# Patient Record
Sex: FEMALE | Race: WHITE | NOT HISPANIC OR LATINO | Employment: UNEMPLOYED | URBAN - NONMETROPOLITAN AREA
[De-identification: names, ages, dates, MRNs, and addresses within clinical notes are randomized per-mention and may not be internally consistent; named-entity substitution may affect disease eponyms.]

---

## 2020-09-23 ENCOUNTER — APPOINTMENT (OUTPATIENT)
Dept: GENERAL RADIOLOGY | Facility: HOSPITAL | Age: 45
End: 2020-09-23

## 2020-09-23 ENCOUNTER — HOSPITAL ENCOUNTER (OUTPATIENT)
Facility: HOSPITAL | Age: 45
Setting detail: OBSERVATION
Discharge: HOME | End: 2020-09-24
Attending: STUDENT IN AN ORGANIZED HEALTH CARE EDUCATION/TRAINING PROGRAM | Admitting: INTERNAL MEDICINE

## 2020-09-23 ENCOUNTER — APPOINTMENT (OUTPATIENT)
Dept: CT IMAGING | Facility: HOSPITAL | Age: 45
End: 2020-09-23

## 2020-09-23 DIAGNOSIS — R53.1 WEAKNESS: ICD-10-CM

## 2020-09-23 DIAGNOSIS — Z78.9 IMPAIRED MOBILITY AND ACTIVITIES OF DAILY LIVING: ICD-10-CM

## 2020-09-23 DIAGNOSIS — Z74.09 IMPAIRED MOBILITY AND ACTIVITIES OF DAILY LIVING: ICD-10-CM

## 2020-09-23 DIAGNOSIS — R51.9 ACUTE NONINTRACTABLE HEADACHE, UNSPECIFIED HEADACHE TYPE: Primary | ICD-10-CM

## 2020-09-23 LAB
ALBUMIN SERPL-MCNC: 4.7 G/DL (ref 3.5–5.2)
ALBUMIN/GLOB SERPL: 2.1 G/DL
ALP SERPL-CCNC: 84 U/L (ref 39–117)
ALT SERPL W P-5'-P-CCNC: 17 U/L (ref 1–33)
ANION GAP SERPL CALCULATED.3IONS-SCNC: 12 MMOL/L (ref 5–15)
AST SERPL-CCNC: 13 U/L (ref 1–32)
B-HCG UR QL: NEGATIVE
BACTERIA UR QL AUTO: ABNORMAL /HPF
BASOPHILS # BLD AUTO: 0.07 10*3/MM3 (ref 0–0.2)
BASOPHILS NFR BLD AUTO: 0.6 % (ref 0–1.5)
BILIRUB SERPL-MCNC: <0.2 MG/DL (ref 0–1.2)
BILIRUB UR QL STRIP: NEGATIVE
BUN SERPL-MCNC: 21 MG/DL (ref 6–20)
BUN/CREAT SERPL: 23.3 (ref 7–25)
CALCIUM SPEC-SCNC: 9.6 MG/DL (ref 8.6–10.5)
CHLORIDE SERPL-SCNC: 104 MMOL/L (ref 98–107)
CLARITY UR: ABNORMAL
CO2 SERPL-SCNC: 21 MMOL/L (ref 22–29)
COLOR UR: YELLOW
CREAT SERPL-MCNC: 0.9 MG/DL (ref 0.57–1)
CRP SERPL-MCNC: 0.76 MG/DL (ref 0–0.5)
DEPRECATED RDW RBC AUTO: 41.8 FL (ref 37–54)
EOSINOPHIL # BLD AUTO: 0.05 10*3/MM3 (ref 0–0.4)
EOSINOPHIL NFR BLD AUTO: 0.4 % (ref 0.3–6.2)
ERYTHROCYTE [DISTWIDTH] IN BLOOD BY AUTOMATED COUNT: 13.5 % (ref 12.3–15.4)
ERYTHROCYTE [SEDIMENTATION RATE] IN BLOOD: 9 MM/HR (ref 0–20)
GFR SERPL CREATININE-BSD FRML MDRD: 69 ML/MIN/1.73
GLOBULIN UR ELPH-MCNC: 2.2 GM/DL
GLUCOSE SERPL-MCNC: 102 MG/DL (ref 65–99)
GLUCOSE UR STRIP-MCNC: NEGATIVE MG/DL
HCT VFR BLD AUTO: 37.7 % (ref 34–46.6)
HGB BLD-MCNC: 12.7 G/DL (ref 12–15.9)
HGB UR QL STRIP.AUTO: NEGATIVE
HYALINE CASTS UR QL AUTO: ABNORMAL /LPF
IMM GRANULOCYTES # BLD AUTO: 0.15 10*3/MM3 (ref 0–0.05)
IMM GRANULOCYTES NFR BLD AUTO: 1.2 % (ref 0–0.5)
KETONES UR QL STRIP: NEGATIVE
LEUKOCYTE ESTERASE UR QL STRIP.AUTO: ABNORMAL
LYMPHOCYTES # BLD AUTO: 2.2 10*3/MM3 (ref 0.7–3.1)
LYMPHOCYTES NFR BLD AUTO: 18.2 % (ref 19.6–45.3)
MCH RBC QN AUTO: 28.7 PG (ref 26.6–33)
MCHC RBC AUTO-ENTMCNC: 33.7 G/DL (ref 31.5–35.7)
MCV RBC AUTO: 85.1 FL (ref 79–97)
MONOCYTES # BLD AUTO: 0.52 10*3/MM3 (ref 0.1–0.9)
MONOCYTES NFR BLD AUTO: 4.3 % (ref 5–12)
NEUTROPHILS NFR BLD AUTO: 75.3 % (ref 42.7–76)
NEUTROPHILS NFR BLD AUTO: 9.13 10*3/MM3 (ref 1.7–7)
NITRITE UR QL STRIP: NEGATIVE
NRBC BLD AUTO-RTO: 0 /100 WBC (ref 0–0.2)
PH UR STRIP.AUTO: 5.5 [PH] (ref 5–9)
PLATELET # BLD AUTO: 183 10*3/MM3 (ref 140–450)
PMV BLD AUTO: 11.3 FL (ref 6–12)
POTASSIUM SERPL-SCNC: 3.6 MMOL/L (ref 3.5–5.2)
PROT SERPL-MCNC: 6.9 G/DL (ref 6–8.5)
PROT UR QL STRIP: NEGATIVE
RBC # BLD AUTO: 4.43 10*6/MM3 (ref 3.77–5.28)
RBC # UR: ABNORMAL /HPF
REF LAB TEST METHOD: ABNORMAL
SARS-COV-2 N GENE RESP QL NAA+PROBE: NOT DETECTED
SODIUM SERPL-SCNC: 137 MMOL/L (ref 136–145)
SP GR UR STRIP: 1.02 (ref 1–1.03)
SQUAMOUS #/AREA URNS HPF: ABNORMAL /HPF
UROBILINOGEN UR QL STRIP: ABNORMAL
WBC # BLD AUTO: 12.12 10*3/MM3 (ref 3.4–10.8)
WBC UR QL AUTO: ABNORMAL /HPF

## 2020-09-23 PROCEDURE — 70496 CT ANGIOGRAPHY HEAD: CPT

## 2020-09-23 PROCEDURE — 85025 COMPLETE CBC W/AUTO DIFF WBC: CPT

## 2020-09-23 PROCEDURE — 70450 CT HEAD/BRAIN W/O DYE: CPT

## 2020-09-23 PROCEDURE — 93005 ELECTROCARDIOGRAM TRACING: CPT | Performed by: STUDENT IN AN ORGANIZED HEALTH CARE EDUCATION/TRAINING PROGRAM

## 2020-09-23 PROCEDURE — 86140 C-REACTIVE PROTEIN: CPT | Performed by: STUDENT IN AN ORGANIZED HEALTH CARE EDUCATION/TRAINING PROGRAM

## 2020-09-23 PROCEDURE — 70498 CT ANGIOGRAPHY NECK: CPT

## 2020-09-23 PROCEDURE — 71045 X-RAY EXAM CHEST 1 VIEW: CPT

## 2020-09-23 PROCEDURE — G0378 HOSPITAL OBSERVATION PER HR: HCPCS

## 2020-09-23 PROCEDURE — C9803 HOPD COVID-19 SPEC COLLECT: HCPCS

## 2020-09-23 PROCEDURE — 85651 RBC SED RATE NONAUTOMATED: CPT | Performed by: STUDENT IN AN ORGANIZED HEALTH CARE EDUCATION/TRAINING PROGRAM

## 2020-09-23 PROCEDURE — 96374 THER/PROPH/DIAG INJ IV PUSH: CPT

## 2020-09-23 PROCEDURE — 36415 COLL VENOUS BLD VENIPUNCTURE: CPT

## 2020-09-23 PROCEDURE — 81025 URINE PREGNANCY TEST: CPT | Performed by: STUDENT IN AN ORGANIZED HEALTH CARE EDUCATION/TRAINING PROGRAM

## 2020-09-23 PROCEDURE — 81001 URINALYSIS AUTO W/SCOPE: CPT

## 2020-09-23 PROCEDURE — 0 IOPAMIDOL PER 1 ML: Performed by: STUDENT IN AN ORGANIZED HEALTH CARE EDUCATION/TRAINING PROGRAM

## 2020-09-23 PROCEDURE — 99204 OFFICE O/P NEW MOD 45 MIN: CPT | Performed by: STUDENT IN AN ORGANIZED HEALTH CARE EDUCATION/TRAINING PROGRAM

## 2020-09-23 PROCEDURE — 80053 COMPREHEN METABOLIC PANEL: CPT

## 2020-09-23 PROCEDURE — 99284 EMERGENCY DEPT VISIT MOD MDM: CPT

## 2020-09-23 PROCEDURE — 87635 SARS-COV-2 COVID-19 AMP PRB: CPT | Performed by: INTERNAL MEDICINE

## 2020-09-23 PROCEDURE — 25010000002 METOCLOPRAMIDE PER 10 MG: Performed by: STUDENT IN AN ORGANIZED HEALTH CARE EDUCATION/TRAINING PROGRAM

## 2020-09-23 PROCEDURE — 93010 ELECTROCARDIOGRAM REPORT: CPT | Performed by: INTERNAL MEDICINE

## 2020-09-23 RX ORDER — ASPIRIN 325 MG
325 TABLET ORAL DAILY
Status: DISCONTINUED | OUTPATIENT
Start: 2020-09-23 | End: 2020-09-24 | Stop reason: HOSPADM

## 2020-09-23 RX ORDER — METOCLOPRAMIDE HYDROCHLORIDE 5 MG/ML
10 INJECTION INTRAMUSCULAR; INTRAVENOUS ONCE
Status: COMPLETED | OUTPATIENT
Start: 2020-09-23 | End: 2020-09-23

## 2020-09-23 RX ORDER — CLONAZEPAM 0.5 MG/1
1 TABLET ORAL NIGHTLY
Status: DISCONTINUED | OUTPATIENT
Start: 2020-09-23 | End: 2020-09-24 | Stop reason: HOSPADM

## 2020-09-23 RX ORDER — PANTOPRAZOLE SODIUM 40 MG/1
40 TABLET, DELAYED RELEASE ORAL
Status: DISCONTINUED | OUTPATIENT
Start: 2020-09-24 | End: 2020-09-24 | Stop reason: HOSPADM

## 2020-09-23 RX ORDER — HEPARIN SODIUM 5000 [USP'U]/ML
5000 INJECTION, SOLUTION INTRAVENOUS; SUBCUTANEOUS EVERY 8 HOURS SCHEDULED
Status: DISCONTINUED | OUTPATIENT
Start: 2020-09-23 | End: 2020-09-24 | Stop reason: HOSPADM

## 2020-09-23 RX ORDER — LISINOPRIL 20 MG/1
20 TABLET ORAL DAILY
Status: DISCONTINUED | OUTPATIENT
Start: 2020-09-24 | End: 2020-09-23

## 2020-09-23 RX ORDER — ATORVASTATIN CALCIUM 40 MG/1
80 TABLET, FILM COATED ORAL NIGHTLY
Status: DISCONTINUED | OUTPATIENT
Start: 2020-09-23 | End: 2020-09-24

## 2020-09-23 RX ORDER — TIZANIDINE 4 MG/1
4 TABLET ORAL
COMMUNITY

## 2020-09-23 RX ORDER — ACETAMINOPHEN 325 MG/1
650 TABLET ORAL EVERY 4 HOURS PRN
Status: DISCONTINUED | OUTPATIENT
Start: 2020-09-23 | End: 2020-09-24 | Stop reason: HOSPADM

## 2020-09-23 RX ORDER — ONDANSETRON 2 MG/ML
4 INJECTION INTRAMUSCULAR; INTRAVENOUS EVERY 6 HOURS PRN
Status: DISCONTINUED | OUTPATIENT
Start: 2020-09-23 | End: 2020-09-24 | Stop reason: HOSPADM

## 2020-09-23 RX ORDER — TOPIRAMATE 50 MG/1
50 TABLET, FILM COATED ORAL DAILY
Status: DISCONTINUED | OUTPATIENT
Start: 2020-09-24 | End: 2020-09-24 | Stop reason: HOSPADM

## 2020-09-23 RX ORDER — SODIUM CHLORIDE 0.9 % (FLUSH) 0.9 %
10 SYRINGE (ML) INJECTION AS NEEDED
Status: DISCONTINUED | OUTPATIENT
Start: 2020-09-23 | End: 2020-09-24 | Stop reason: HOSPADM

## 2020-09-23 RX ORDER — PRAZOSIN HYDROCHLORIDE 1 MG/1
1 CAPSULE ORAL NIGHTLY
COMMUNITY

## 2020-09-23 RX ORDER — ASPIRIN 300 MG/1
300 SUPPOSITORY RECTAL DAILY
Status: DISCONTINUED | OUTPATIENT
Start: 2020-09-23 | End: 2020-09-24 | Stop reason: HOSPADM

## 2020-09-23 RX ORDER — SUMATRIPTAN 50 MG/1
50 TABLET, FILM COATED ORAL
Status: DISCONTINUED | OUTPATIENT
Start: 2020-09-23 | End: 2020-09-24 | Stop reason: HOSPADM

## 2020-09-23 RX ORDER — TRAZODONE HYDROCHLORIDE 100 MG/1
100 TABLET ORAL
COMMUNITY

## 2020-09-23 RX ORDER — SODIUM CHLORIDE 0.9 % (FLUSH) 0.9 %
10 SYRINGE (ML) INJECTION EVERY 12 HOURS SCHEDULED
Status: DISCONTINUED | OUTPATIENT
Start: 2020-09-23 | End: 2020-09-24 | Stop reason: HOSPADM

## 2020-09-23 RX ORDER — ACETAMINOPHEN 500 MG
1000 TABLET ORAL ONCE
Status: COMPLETED | OUTPATIENT
Start: 2020-09-23 | End: 2020-09-23

## 2020-09-23 RX ORDER — PANTOPRAZOLE SODIUM 40 MG/1
40 TABLET, DELAYED RELEASE ORAL
COMMUNITY

## 2020-09-23 RX ORDER — CLONAZEPAM 1 MG/1
1 TABLET ORAL
COMMUNITY

## 2020-09-23 RX ORDER — TOPIRAMATE 100 MG/1
50 TABLET, FILM COATED ORAL
COMMUNITY

## 2020-09-23 RX ORDER — LISINOPRIL 20 MG/1
20 TABLET ORAL DAILY
COMMUNITY

## 2020-09-23 RX ADMIN — SODIUM CHLORIDE, PRESERVATIVE FREE 10 ML: 5 INJECTION INTRAVENOUS at 20:48

## 2020-09-23 RX ADMIN — ACETAMINOPHEN 650 MG: 325 TABLET, FILM COATED ORAL at 20:46

## 2020-09-23 RX ADMIN — ASPIRIN 325 MG: 325 TABLET ORAL at 20:46

## 2020-09-23 RX ADMIN — SUMATRIPTAN SUCCINATE 50 MG: 50 TABLET ORAL at 20:46

## 2020-09-23 RX ADMIN — METOCLOPRAMIDE 10 MG: 5 INJECTION, SOLUTION INTRAMUSCULAR; INTRAVENOUS at 15:43

## 2020-09-23 RX ADMIN — CLONAZEPAM 1 MG: 0.5 TABLET ORAL at 20:46

## 2020-09-23 RX ADMIN — ACETAMINOPHEN 1000 MG: 500 TABLET ORAL at 15:43

## 2020-09-23 RX ADMIN — SODIUM CHLORIDE, POTASSIUM CHLORIDE, SODIUM LACTATE AND CALCIUM CHLORIDE 1000 ML: 600; 310; 30; 20 INJECTION, SOLUTION INTRAVENOUS at 15:42

## 2020-09-23 RX ADMIN — ATORVASTATIN CALCIUM 80 MG: 40 TABLET, FILM COATED ORAL at 20:47

## 2020-09-23 RX ADMIN — IOPAMIDOL 90 ML: 755 INJECTION, SOLUTION INTRAVENOUS at 15:09

## 2020-09-23 NOTE — H&P
[facility] Medicine Admission      Date of Admission: 2020      Primary Care Physician: Naomi Doan MD      Chief Complaint: left side weakness     HPI: Ms. Johnson is a 44-year-old female with a history of Ménière's disease and cochlear implant along with bipolar disorder and chronic kidney disease stage III who presented to the ER today with a sudden onset of left-sided headache, severe dizziness, and left-sided weakness involving her face, left arm, and left leg.  She awakened this morning from sleep feeling close to her baseline.  Around 10:30 AM she had a sudden onset of severe dizziness and unilateral headache involving the left side of her head.  She had blurry vision to the left eye.    Concurrent Medical History:  has no past medical history on file.   - CKD 3   - Bipolar Disorder  - Meniere's Disease  - Cochlear Implant     Past Surgical History:  has no past surgical history on file.   - Cochlear Implant    Family History: family history is not on file.   Father -  of COPD  Mother is living and presumed healthy by the patient     Social History:     - history of social smoking in past but none currently  - no alcohol  - not employed, disabled  -  and lives with her     Allergies:   Allergies   Allergen Reactions   • Prednisone Itching       Medications:   Prior to Admission medications    Medication Sig Start Date End Date Taking? Authorizing Provider   Cariprazine HCl (Vraylar) 3 MG capsule capsule Take 3 mg by mouth Daily.    ProviderBrandon MD   clonazePAM (KlonoPIN) 1 MG tablet Take 1 mg by mouth.    Bradnon Bland MD   lisinopril (PRINIVIL,ZESTRIL) 20 MG tablet Take 20 mg by mouth Daily.    Brandon Bland MD   pantoprazole (PROTONIX) 40 MG EC tablet Take 40 mg by mouth.    Brandon Bland MD   tiZANidine (ZANAFLEX) 4 MG tablet Take 4 mg by mouth.    Brandon Bland MD   topiramate (TOPAMAX) 100  MG tablet Take 50 mg by mouth.    Provider, MD Brandon   traZODone (DESYREL) 100 MG tablet Take 100 mg by mouth.    Provider, MD Brandon       Review of Systems:  Review of Systems   Constitutional: Positive for activity change. Negative for chills and fever.   HENT: Negative for congestion, rhinorrhea and sore throat.    Eyes: Negative for discharge.   Respiratory: Negative for cough and shortness of breath.    Cardiovascular: Negative for chest pain and palpitations.   Gastrointestinal: Negative for abdominal pain, constipation, diarrhea, nausea and vomiting.   Genitourinary: Negative for dysuria, frequency and urgency.   Musculoskeletal: Negative for arthralgias and myalgias.   Skin: Negative for rash.   Neurological: Positive for dizziness, facial asymmetry, weakness and headaches. Negative for seizures, syncope, speech difficulty, light-headedness and numbness.   Psychiatric/Behavioral: Negative for confusion.          Physical Exam:   Temp:  [98.6 °F (37 °C)] 98.6 °F (37 °C)  Heart Rate:  [100-127] 100  Resp:  [20] 20  BP: (110-129)/(67-83) 129/76  Physical Exam  Constitutional:       General: She is not in acute distress.     Appearance: Normal appearance. She is not ill-appearing or toxic-appearing.   HENT:      Head: Normocephalic.      Right Ear: External ear normal.      Left Ear: External ear normal.      Mouth/Throat:      Mouth: Mucous membranes are moist.      Pharynx: Oropharynx is clear.   Eyes:      General: No scleral icterus.     Extraocular Movements: Extraocular movements intact.      Pupils: Pupils are equal, round, and reactive to light.   Neck:      Musculoskeletal: Neck supple.   Cardiovascular:      Rate and Rhythm: Normal rate and regular rhythm.      Pulses: Normal pulses.      Heart sounds: No murmur.   Pulmonary:      Breath sounds: Normal breath sounds. No wheezing, rhonchi or rales.   Abdominal:      General: Bowel sounds are normal.      Palpations: Abdomen is soft.       Tenderness: There is no abdominal tenderness.   Musculoskeletal:      Right lower leg: No edema.      Left lower leg: No edema.   Skin:     General: Skin is warm and dry.   Neurological:      Mental Status: She is alert and oriented to person, place, and time.      Sensory: No sensory deficit.      Comments: Left facial droop, slight decreased left  strength           Results Reviewed:  I have personally reviewed current lab, radiology, and data and agree with results.  Lab Results (last 24 hours)     Procedure Component Value Units Date/Time    Sedimentation Rate [[id number]]  (Normal) Collected: 09/23/20 1300    Specimen: Blood Updated: 09/23/20 1623     Sed Rate 9 mm/hr     Pregnancy, Urine - Urine, Clean Catch [[id number]]  (Normal) Collected: 09/23/20 1325    Specimen: Urine, Clean Catch Updated: 09/23/20 1507     HCG, Urine QL Negative    C-reactive Protein [[id number]]  (Abnormal) Collected: 09/23/20 1300    Specimen: Blood Updated: 09/23/20 1507     C-Reactive Protein 0.76 mg/dL     Urinalysis With Microscopic If Indicated (No Culture) - Urine, Clean Catch [[id number]]  (Abnormal) Collected: 09/23/20 1325    Specimen: Urine, Clean Catch Updated: 09/23/20 1351     Color, UA Yellow     Appearance, UA Cloudy     pH, UA 5.5     Specific Gravity, UA 1.018     Glucose, UA Negative     Ketones, UA Negative     Bilirubin, UA Negative     Blood, UA Negative     Protein, UA Negative     Leuk Esterase, UA Moderate (2+)     Nitrite, UA Negative     Urobilinogen, UA 0.2 E.U./dL    Urinalysis, Microscopic Only - Urine, Clean Catch [[id number]]  (Abnormal) Collected: 09/23/20 1325    Specimen: Urine, Clean Catch Updated: 09/23/20 1351     RBC, UA 6-12 /HPF      WBC, UA 6-12 /HPF      Bacteria, UA 1+ /HPF      Squamous Epithelial Cells, UA 3-5 /HPF      Hyaline Casts, UA 3-6 /LPF      Methodology Automated Microscopy    Comprehensive Metabolic Panel [[id number]]  (Abnormal) Collected: 09/23/20 1300    Specimen: Blood Updated:  09/23/20 1327     Glucose 102 mg/dL      BUN 21 mg/dL      Creatinine 0.90 mg/dL      Sodium 137 mmol/L      Potassium 3.6 mmol/L      Chloride 104 mmol/L      CO2 21.0 mmol/L      Calcium 9.6 mg/dL      Total Protein 6.9 g/dL      Albumin 4.70 g/dL      ALT (SGPT) 17 U/L      AST (SGOT) 13 U/L      Alkaline Phosphatase 84 U/L      Total Bilirubin <0.2 mg/dL      eGFR Non African Amer 69 mL/min/1.73      Globulin 2.2 gm/dL      A/G Ratio 2.1 g/dL      BUN/Creatinine Ratio 23.3     Anion Gap 12.0 mmol/L     Narrative:      GFR Normal >60  Chronic Kidney Disease <60  Kidney Failure <15      CBC & Differential [[id number]]  (Abnormal) Collected: 09/23/20 1300    Specimen: Blood Updated: 09/23/20 1311    Narrative:      The following orders were created for panel order CBC & Differential.  Procedure                               Abnormality         Status                     ---------                               -----------         ------                     CBC Auto Differential[[id number]]        Abnormal            Final result                 Please view results for these tests on the individual orders.    CBC Auto Differential [[id number]]  (Abnormal) Collected: 09/23/20 1300    Specimen: Blood Updated: 09/23/20 1311     WBC 12.12 10*3/mm3      RBC 4.43 10*6/mm3      Hemoglobin 12.7 g/dL      Hematocrit 37.7 %      MCV 85.1 fL      MCH 28.7 pg      MCHC 33.7 g/dL      RDW 13.5 %      RDW-SD 41.8 fl      MPV 11.3 fL      Platelets 183 10*3/mm3      Neutrophil % 75.3 %      Lymphocyte % 18.2 %      Monocyte % 4.3 %      Eosinophil % 0.4 %      Basophil % 0.6 %      Immature Grans % 1.2 %      Neutrophils, Absolute 9.13 10*3/mm3      Lymphocytes, Absolute 2.20 10*3/mm3      Monocytes, Absolute 0.52 10*3/mm3      Eosinophils, Absolute 0.05 10*3/mm3      Basophils, Absolute 0.07 10*3/mm3      Immature Grans, Absolute 0.15 10*3/mm3      nRBC 0.0 /100 WBC         Imaging Results (Last 24 Hours)     Procedure Component  Value Units Date/Time    CT Angiogram Head [[id number]] Collected: 09/23/20 1502     Updated: 09/23/20 1608    Narrative:      EXAM: CT HEAD ANGIOGRAPHY WITHOUT THEN WITH IV CONTRAST, CT NECK  ANGIOGRAPHY WITHOUT THEN WITH IV CONTRAST    COMPARISONS: CT head dated same day    INDICATION: weakness, dizziness    TECHNIQUE: CT images were obtained of the cervical and  intracranial vasculature after the uneventful administration of  iodinated intravenous contrast in the arterial phase. Degree of  stenosis evaluated by NASCET criteria. Reformats to include 3D  MIP were provided.    FINDINGS:  Vasculature:  Aortic arch: Three-vessel aortic arch.    Right:  Common carotid: Normal course without focal stenosis, dissection,  or aneurysmal dilatation. No significant carotid atherosclerosis.  Carotid bulb: No significant atherosclerosis.    ICA: The cervical, petrous, cavernous, and supraclinoid segments  of the internal carotid artery are without focal stenosis,  dissection, or aneurysmal dilatation.  ECA: Proximal portion of the ECA is within normal limits.    Vertebral artery: Normal course without focal stenosis,  dissection, or aneurysmal dilatation. Vertebrobasilar junction is  within normal limits.    Left:  Common carotid: Normal course without focal stenosis, dissection,  or aneurysmal dilatation. No significant carotid atherosclerosis.  Carotid bulb: No significant atherosclerosis.    ICA: The cervical, petrous, cavernous, and supraclinoid segments  of the internal carotid artery are without focal stenosis,  dissection, or aneurysmal dilatation.  ECA: Proximal portion of the ECA is within normal limits.    Vertebral artery: Normal course without focal stenosis,  dissection, or aneurysmal dilatation. Vertebrobasilar junction is  within normal limits.    [place]: Within normal limits. The ACAs, MCAs, and MCAs'  distal branches are within normal limits. The anterior  communicating artery is present. The left  posterior communicating  arteries present. The right communicating artery is not  visualized and may be below threshold for CT technique versus  congenitally absent.     Basilar Artery and Posterior Circulation: Within normal limits.    Venous: The major cervical venous vasculature is unremarkable.   Dural venous sinuses are within normal limits.    Non-vasculature:  Soft tissues: There is a 1.1 x 0.8 cm left thyroid nodule.  Lymph nodes: No bulky cervical chain lymphadenopathy.     Musculoskeletal: No acute fracture or suspicious osseous lesion.  Left auditory stimulator battery pack is noted along the left  temporal lobe with leads leading into the left ear.    Lungs: Appearance of biapical emphysema.      Impression:      CT neck angiogram within normal limits. No aneurysm, dissection,  or focal stenosis.    Left thyroid nodule measuring 1.1 cm. Consider nonemergent  outpatient thyroid ultrasound for further evaluation.    Appearance of biapical emphysema.    Electronically signed by:  Christophe Owens MD  9/23/2020  4:07 PM CDT Workstation: [id number]    CT Angiogram Neck [[id number]] Collected: 09/23/20 1502     Updated: 09/23/20 1608    Narrative:      EXAM: CT HEAD ANGIOGRAPHY WITHOUT THEN WITH IV CONTRAST, CT NECK  ANGIOGRAPHY WITHOUT THEN WITH IV CONTRAST    COMPARISONS: CT head dated same day    INDICATION: weakness, dizziness    TECHNIQUE: CT images were obtained of the cervical and  intracranial vasculature after the uneventful administration of  iodinated intravenous contrast in the arterial phase. Degree of  stenosis evaluated by NASCET criteria. Reformats to include 3D  MIP were provided.    FINDINGS:  Vasculature:  Aortic arch: Three-vessel aortic arch.    Right:  Common carotid: Normal course without focal stenosis, dissection,  or aneurysmal dilatation. No significant carotid atherosclerosis.  Carotid bulb: No significant atherosclerosis.    ICA: The cervical, petrous, cavernous, and  supraclinoid segments  of the internal carotid artery are without focal stenosis,  dissection, or aneurysmal dilatation.  ECA: Proximal portion of the ECA is within normal limits.    Vertebral artery: Normal course without focal stenosis,  dissection, or aneurysmal dilatation. Vertebrobasilar junction is  within normal limits.    Left:  Common carotid: Normal course without focal stenosis, dissection,  or aneurysmal dilatation. No significant carotid atherosclerosis.  Carotid bulb: No significant atherosclerosis.    ICA: The cervical, petrous, cavernous, and supraclinoid segments  of the internal carotid artery are without focal stenosis,  dissection, or aneurysmal dilatation.  ECA: Proximal portion of the ECA is within normal limits.    Vertebral artery: Normal course without focal stenosis,  dissection, or aneurysmal dilatation. Vertebrobasilar junction is  within normal limits.    [place]: Within normal limits. The ACAs, MCAs, and MCAs'  distal branches are within normal limits. The anterior  communicating artery is present. The left posterior communicating  arteries present. The right communicating artery is not  visualized and may be below threshold for CT technique versus  congenitally absent.     Basilar Artery and Posterior Circulation: Within normal limits.    Venous: The major cervical venous vasculature is unremarkable.   Dural venous sinuses are within normal limits.    Non-vasculature:  Soft tissues: There is a 1.1 x 0.8 cm left thyroid nodule.  Lymph nodes: No bulky cervical chain lymphadenopathy.     Musculoskeletal: No acute fracture or suspicious osseous lesion.  Left auditory stimulator battery pack is noted along the left  temporal lobe with leads leading into the left ear.    Lungs: Appearance of biapical emphysema.      Impression:      CT neck angiogram within normal limits. No aneurysm, dissection,  or focal stenosis.    Left thyroid nodule measuring 1.1 cm. Consider  "nonemergent  outpatient thyroid ultrasound for further evaluation.    Appearance of biapical emphysema.    Electronically signed by:  Christophe Owens MD  9/23/2020  4:07 PM CDT Workstation: [id number]    CT Head Without Contrast [[id number]] Collected: 09/23/20 1502     Updated: 09/23/20 1531    Narrative:      EXAMINATION:  CT SCAN OF THE HEAD WITHOUT INTRAVENOUS CONTRAST    CLINICAL INFORMATION:  left headache, left side \"weakness\"    This exam was performed using radiation doses that are as low as  reasonably achievable (ALARA).  This exam was performed according to our departmental dose  optimization program, which includes automated exposure control,  adjustment of the mA and/or KV according to patient size and/or  use of iterative reconstruction technique.    COMPARISON: None available.    TECHNIQUE:  Axial images from skull base to vertex.        FINDINGS:  There is a left sided subcutaneous scalp implant with a wire into  the left auditory canal resulting in streak artifact.  There is no evidence of intracranial hemorrhage, parenchymal  mass, midline shift, or focal mass effect.  There is no hydrocephalus or effacement of the basilar cisterns.    There is no extra-axial hemorrhage or collection identified.    The mastoid air cells and visualized paranasal sinuses appear  clear.          Impression:      No evidence of intracranial hemorrhage, mass effect or large  acute infarct.      Electronically signed by:  Tommy Lynne MD  9/23/2020 3:30 PM CDT  Workstation: [id number]    XR Chest 1 View [[id number]] Collected: 09/23/20 1446     Updated: 09/23/20 1508    Narrative:      PROCEDURE: XR CHEST 1 VW    VIEWS:Single    INDICATION: Weakness    COMPARISON: None    FINDINGS:       - lines/tubes: None    - cardiac: Size within normal limits.    - mediastinum: Contour within normal limits.     - lungs: Mild streaky opacity right lung base may represent  atelectasis or infiltrate.     - pleura: No evidence " of  fluid.      - osseous: Unremarkable for age.      Impression:      Mild streaky right base atelectasis versus infiltrate      Electronically signed by:  Yuridia Guillen MD  9/23/2020 3:07 PM CDT  Workstation: [id number]            Assessment:    Active Hospital Problems    Diagnosis   • Acute nonintractable headache             Plan:    1.  Left Sided weakness  2.  Severe Unilateral Headache - possible complicated migraine  3.  Meniere's Disease  4.  CKD3 (per patient report) - GFR and Cr normal    - Will place pt in Obs overnight  - Repeat CT head in am (cannot have MRI secondary to cochlear implant)  - neuro checks  - tele neuro consulted  - NPO until dysphagia screen completed  - permissive HTN   - Imitrex for headache (possible complicated migraine)  - asa and statin   - lipid panel and A1C in am  - PT/OT consult  - If no better and CVA ruled out, consider ENT consult given her severe Meniere's disease     I discussed the patient's findings and my recommendations with: the patient   Oneal Holm MD

## 2020-09-23 NOTE — PLAN OF CARE
Problem: Adult Inpatient Plan of Care  Goal: Plan of Care Review  Outcome: Ongoing, Progressing  Goal: Patient-Specific Goal (Individualized)  Outcome: Ongoing, Progressing  Goal: Absence of Hospital-Acquired Illness or Injury  Outcome: Ongoing, Progressing  Goal: Optimal Comfort and Wellbeing  Outcome: Ongoing, Progressing  Goal: Readiness for Transition of Care  Outcome: Ongoing, Progressing  Intervention: Mutually Develop Transition Plan  Recent Flowsheet Documentation  Taken 9/23/2020 1737 by Blanca Rios, RN  Transportation Anticipated: car, drives self  Patient/Family Anticipated Services at Transition: none  Patient/Family Anticipates Transition to: home with family  Taken 9/23/2020 1735 by Blanca Rios, RN  Equipment Currently Used at Home: none   Goal Outcome Evaluation:

## 2020-09-23 NOTE — ED PROVIDER NOTES
"Subjective   44-year-old female past medical history of headaches, Ménière's disease comes to the ER chief complaint of acute onset dizziness, left-sided headache, facial asymmetry, left extremities weakness that started at 10:30 AM.  Patient is outside the window for TPA.  She has a history of dizziness with her Ménière's disease, but it is \"worse today than normal \".  She says the left side of her body \"feels strange \".  He is unable to describe what she means by that.          Review of Systems   Constitutional: Negative for activity change, appetite change, chills, diaphoresis, fatigue and fever.   HENT: Negative for congestion and rhinorrhea.    Respiratory: Negative for cough, shortness of breath and wheezing.    Cardiovascular: Negative for chest pain, palpitations and leg swelling.   Gastrointestinal: Negative for abdominal pain, diarrhea and nausea.   Genitourinary: Negative for dysuria and flank pain.   Skin: Negative for color change and rash.   Neurological: Positive for dizziness, facial asymmetry, weakness, numbness and headaches. Negative for tremors, syncope, speech difficulty and light-headedness.   Psychiatric/Behavioral: Negative for agitation. The patient is not nervous/anxious.        History reviewed. No pertinent past medical history.    Allergies   Allergen Reactions   • Prednisone Itching       History reviewed. No pertinent surgical history.    History reviewed. No pertinent family history.    Social History     Socioeconomic History   • Marital status:      Spouse name: Not on file   • Number of children: Not on file   • Years of education: Not on file   • Highest education level: Not on file           Objective    Vitals:    09/23/20 1251 09/23/20 1416 09/23/20 1501 09/23/20 1525   BP: 121/83 122/77 129/75 129/76   BP Location: Left arm      Patient Position: Sitting      Pulse: 115 100 100 100   Resp: 20      Temp:       TempSrc:       SpO2: 98% 100% 100% 100%   Weight:     "   Height:           Physical Exam  Vitals signs and nursing note reviewed.   Constitutional:       General: She is not in acute distress.     Appearance: She is well-developed. She is not ill-appearing, toxic-appearing or diaphoretic.   HENT:      Head: Normocephalic.      Right Ear: External ear normal.      Left Ear: External ear normal.   Eyes:      General: Visual field deficit (left peripheral) present.      Extraocular Movements: Extraocular movements intact.      Conjunctiva/sclera: Conjunctivae normal.      Pupils: Pupils are equal, round, and reactive to light.   Cardiovascular:      Rate and Rhythm: Tachycardia present.      Pulses: Normal pulses.   Pulmonary:      Effort: Pulmonary effort is normal. No accessory muscle usage or respiratory distress.      Breath sounds: No decreased breath sounds or wheezing.   Chest:      Chest wall: No tenderness.   Abdominal:      General: Bowel sounds are normal.      Palpations: Abdomen is soft. Abdomen is not rigid.      Tenderness: There is no abdominal tenderness (deep palpation).   Skin:     General: Skin is warm and dry.      Capillary Refill: Capillary refill takes less than 2 seconds.   Neurological:      Mental Status: She is alert and oriented to person, place, and time. She is not disoriented.      GCS: GCS eye subscore is 4. GCS verbal subscore is 5. GCS motor subscore is 6.      Cranial Nerves: Facial asymmetry (mild) present. No cranial nerve deficit (grossly intact) or dysarthria.      Sensory: Sensory deficit present.      Motor: Weakness (LUE and LLE mild) present.      Coordination: Coordination is intact. Finger-Nose-Finger Test normal.      Gait: Gait is intact.   Psychiatric:         Behavior: Behavior normal.         ECG 12 Lead      Date/Time: 9/23/2020 4:35 PM  Performed by: Nato Truong MD  Authorized by: Nato Truong MD   Interpreted by physician  Rhythm: sinus tachycardia  Rate: tachycardic  QRS axis: normal  ST Segments: ST  segments normal                   ED Course      Results for orders placed or performed during the hospital encounter of 09/23/20   Comprehensive Metabolic Panel    Specimen: Blood   Result Value Ref Range    Glucose 102 (H) 65 - 99 mg/dL    BUN 21 (H) 6 - 20 mg/dL    Creatinine 0.90 0.57 - 1.00 mg/dL    Sodium 137 136 - 145 mmol/L    Potassium 3.6 3.5 - 5.2 mmol/L    Chloride 104 98 - 107 mmol/L    CO2 21.0 (L) 22.0 - 29.0 mmol/L    Calcium 9.6 8.6 - 10.5 mg/dL    Total Protein 6.9 6.0 - 8.5 g/dL    Albumin 4.70 3.50 - 5.20 g/dL    ALT (SGPT) 17 1 - 33 U/L    AST (SGOT) 13 1 - 32 U/L    Alkaline Phosphatase 84 39 - 117 U/L    Total Bilirubin <0.2 0.0 - 1.2 mg/dL    eGFR Non African Amer 69 >60 mL/min/1.73    Globulin 2.2 gm/dL    A/G Ratio 2.1 g/dL    BUN/Creatinine Ratio 23.3 7.0 - 25.0    Anion Gap 12.0 5.0 - 15.0 mmol/L   CBC Auto Differential    Specimen: Blood   Result Value Ref Range    WBC 12.12 (H) 3.40 - 10.80 10*3/mm3    RBC 4.43 3.77 - 5.28 10*6/mm3    Hemoglobin 12.7 12.0 - 15.9 g/dL    Hematocrit 37.7 34.0 - 46.6 %    MCV 85.1 79.0 - 97.0 fL    MCH 28.7 26.6 - 33.0 pg    MCHC 33.7 31.5 - 35.7 g/dL    RDW 13.5 12.3 - 15.4 %    RDW-SD 41.8 37.0 - 54.0 fl    MPV 11.3 6.0 - 12.0 fL    Platelets 183 140 - 450 10*3/mm3    Neutrophil % 75.3 42.7 - 76.0 %    Lymphocyte % 18.2 (L) 19.6 - 45.3 %    Monocyte % 4.3 (L) 5.0 - 12.0 %    Eosinophil % 0.4 0.3 - 6.2 %    Basophil % 0.6 0.0 - 1.5 %    Immature Grans % 1.2 (H) 0.0 - 0.5 %    Neutrophils, Absolute 9.13 (H) 1.70 - 7.00 10*3/mm3    Lymphocytes, Absolute 2.20 0.70 - 3.10 10*3/mm3    Monocytes, Absolute 0.52 0.10 - 0.90 10*3/mm3    Eosinophils, Absolute 0.05 0.00 - 0.40 10*3/mm3    Basophils, Absolute 0.07 0.00 - 0.20 10*3/mm3    Immature Grans, Absolute 0.15 (H) 0.00 - 0.05 10*3/mm3    nRBC 0.0 0.0 - 0.2 /100 WBC   Urinalysis With Microscopic If Indicated (No Culture) - Urine, Clean Catch    Specimen: Urine, Clean Catch   Result Value Ref Range    Color, UA  Yellow Yellow, Straw, Dark Yellow, Michelle    Appearance, UA Cloudy (A) Clear    pH, UA 5.5 5.0 - 9.0    Specific Gravity, UA 1.018 1.003 - 1.030    Glucose, UA Negative Negative    Ketones, UA Negative Negative    Bilirubin, UA Negative Negative    Blood, UA Negative Negative    Protein, UA Negative Negative    Leuk Esterase, UA Moderate (2+) (A) Negative    Nitrite, UA Negative Negative    Urobilinogen, UA 0.2 E.U./dL 0.2 - 1.0 E.U./dL   Urinalysis, Microscopic Only - Urine, Clean Catch    Specimen: Urine, Clean Catch   Result Value Ref Range    RBC, UA 6-12 (A) None Seen /HPF    WBC, UA 6-12 (A) None Seen, 0-2, 3-5 /HPF    Bacteria, UA 1+ (A) None Seen /HPF    Squamous Epithelial Cells, UA 3-5 (A) None Seen, 0-2 /HPF    Hyaline Casts, UA 3-6 None Seen /LPF    Methodology Automated Microscopy    Sedimentation Rate    Specimen: Blood   Result Value Ref Range    Sed Rate 9 0 - 20 mm/hr   C-reactive Protein    Specimen: Blood   Result Value Ref Range    C-Reactive Protein 0.76 (H) 0.00 - 0.50 mg/dL   Pregnancy, Urine - Urine, Clean Catch    Specimen: Urine, Clean Catch   Result Value Ref Range    HCG, Urine QL Negative Negative     CT Head Without Contrast   Final Result   No evidence of intracranial hemorrhage, mass effect or large   acute infarct.         Electronically signed by:  Tommy Lynne MD  9/23/2020 3:30 PM CDT   Workstation: [id number]      CT Angiogram Head   Final Result   CT neck angiogram within normal limits. No aneurysm, dissection,   or focal stenosis.      Left thyroid nodule measuring 1.1 cm. Consider nonemergent   outpatient thyroid ultrasound for further evaluation.      Appearance of biapical emphysema.      Electronically signed by:  Christophe Owens MD  9/23/2020   4:07 PM CDT Workstation: [id number]      CT Angiogram Neck   Final Result   CT neck angiogram within normal limits. No aneurysm, dissection,   or focal stenosis.      Left thyroid nodule measuring 1.1 cm. Consider  nonemergent   outpatient thyroid ultrasound for further evaluation.      Appearance of biapical emphysema.      Electronically signed by:  Christophe Owens MD  9/23/2020   4:07 PM CDT Workstation: [id number]      XR Chest 1 View   Final Result   Mild streaky right base atelectasis versus infiltrate         Electronically signed by:  Yuridia Guillen MD  9/23/2020 3:07 PM CDT   Workstation: [id number]      CT Head Without Contrast    (Results Pending)                                      NIHSS (NIH Stroke Scale/Score) reviewed and/or performed as part of the patient evaluation and treatment planning process.  The result associated with this review/performance is: 3       MDM  Number of Diagnoses or Management Options  Acute nonintractable headache, unspecified headache type: new and requires workup  Weakness: new and requires workup  Diagnosis management comments: Vital signs are stable, afebrile.  Labs are unremarkable.  EKG sinus tachycardia.  Heart rate is improved during ER stay.  CTA head and neck and CT head without negative for acute pathology.  Chest x-ray showed no acute cardiopulmonary processes.  Neurology consulted and evaluated patient.  Recommended admission with repeat CT in the morning.  Also recommended that ENT see the patient during hospitalization.  Patient cannot get an MRI due to the implanted magnet for her Ménière's disease.  Patient received migraine cocktail.  Spoke with the on-call hospitalist who agreed to admit for further evaluation and treatment.       Amount and/or Complexity of Data Reviewed  Clinical lab tests: reviewed and ordered  Tests in the radiology section of CPT®: ordered and reviewed  Tests in the medicine section of CPT®: reviewed and ordered  Decide to obtain previous medical records or to obtain history from someone other than the patient: yes  Obtain history from someone other than the patient: yes  Review and summarize past medical records: yes  Discuss the patient  with other providers: yes    Patient Progress  Patient progress: improved      Final diagnoses:   Acute nonintractable headache, unspecified headache type   Weakness            Nato Truong MD  09/23/20 [id number]

## 2020-09-23 NOTE — CONSULTS
Stroke Consult Note    Patient Name: Cherri Boyer   MRN: [id number]  Age: 44 y.o.  Sex: female  : 1975    Primary Care Physician: Naomi Doan MD  Referring Physician:  Ludwig Johnson MD    TIME STROKE TEAM CALLED:  1630 EST     TIME PATIENT SEEN: 1645 EST    Handedness: R   Race: W     Chief Complaint/Reason for Consultation: left side paresthesias     Subjective .  HPI:   This is 45-year-old female with history of migraines,  Ménière's disease status post cochlear implant presents with sudden onset of left temporal headache with numbness of the left arm.  Patient claims that this is not her  typical migraine.  She never had symptoms like this before.     She takes Topamax as prophylaxis for migraine.  Denies any weakness, speech problems, vision problems.  Patient not taking any antithrombotics at home.      Last Known Normal Date/Time: 2020 1130 EST     Review of Systems   Constitutional: No fatigue  HENT: Negative for nosebleeds and rhinorrhea.    Eyes: Negative for redness.   Respiratory: Negative for cough.    Gastrointestinal: Negative for anal bleeding.   Endocrine: Negative for polydipsia.   Genitourinary: Negative for enuresis and urgency.   Musculoskeletal: Negative for joint swelling.   Neurological: Negative for tremors.   Psychiatric/Behavioral: Negative for hallucinations.     Temp:  [98.6 °F (37 °C)] 98.6 °F (37 °C)  Heart Rate:  [100-127] 100  Resp:  [20] 20  BP: (110-129)/(67-83) 129/75      GEN: NAD, pleasant, cooperative  Eyes-show anicteric sclera, moist conjunctiva with no lid lag, no redness  Neck-trachea midline.  There is no thyromegaly.  ENMT-oropharynx clear with moist mucous membranes and good dentition.  Skin-no rash, lesions or ulcers.  Cardiovascular exam-no pedal edema, regular rate and rhythm.  CHEST: No signs of resp distress, on room air  Abdomen-no abdominal distention, nontender.  Psychiatric exam-alert oriented x3 with intact judgment and  insight      NEURO    MENTAL STATUS: AAOx3, memory intact, fund of knowledge appropriate    LANG/SPEECH: Naming and repetition intact, fluent, follows 3-step commands    CRANIAL NERVES:      II: Pupils equal and reactive, no RAPD, no VF deficits, fundus(not done)      III, IV, VI: EOM intact, no gaze preference or deviation, no nystagmus.      V: normal sensation in V1, V2, and V3 segments bilaterally      VII: no asymmetry, no nasolabial fold flattening      VIII: normal hearing to speech      IX, X: normal palatal elevation, no uvular deviation      XI: 5/5 head turn and 5/5 shoulder shrug bilaterally      XII: midline tongue protrusion    MOTOR:  Normal tone throughout  5/5 muscle power in Rt shoulder abductors/adductors, elbow flexors/extensors, wrist flexors/extensors, finger abductors/adductors.  5/5 in Rt hip flexors/extensors, knee flexors/extensors, ankle dorsiflexors and plantar flexors.    5/5 muscle power in Lt shoulder abductors/adductors, elbow flexors/extensors, wrist flexors/extensors, finger abductors/adductors.  5/5 in Lt hip flexors/extensors, knee flexors/extensors, ankle dorsiflexors and plantea flexors.    REFLEXES:  no Brock's, no clonus    SENSORY:    Mild decreased sensation to light tough on the left arm.     COORDINATION: Normal finger to nose and heel to shin, no tremor, no dysmetria    STATION: Not assessed due to patient condition    GAIT: Not assessed due to patient condition    Acute Stroke Data    Alteplase (tPA) Inclusion / Exclusion Criteria    Time: 15:31 CDT  Person Administering Scale: Cuong Ibanez MD    Inclusion Criteria  [x]   18 years of age or greater   []   Onset of symptoms < 4.5 hours before beginning treatment (stroke onset = time patient was last seen well or without symptoms).   []   Diagnosis of acute ischemic stroke causing measurable disabling deficit (Complete Hemianopia, Any Aphasia, Visual or Sensory Extinction, Any weakness limiting sustained effort  against gravity)   []   Any remaining deficit considered potentially disabling in view of patient and practitioner   Exclusion criteria (Do not proceed with Alteplase if any are checked under exclusion criteria)  [x]   Onset unknown or GREATER than 4.5 hours   []   ICH on CT/MRI   []   CT demonstrates hypodensity representing acute or subacute infarct   []   Significant head trauma or prior stroke in the previous 3 months   []   Symptoms suggestive of subarachnoid hemorrhage   []   History of un-ruptured intracranial aneurysm GREATER than 10 mm   []   Recent intracranial or intraspinal surgery within the last 3 months   []   Arterial puncture at a non-compressible site in the previous 7 days   []   Active internal bleeding   []   Acute bleeding tendency   []   Platelet count LESS than 100,000 for known hematological diseases such as leukemia, thrombocytopenia or chronic cirrhosis   []   Current use of anticoagulant with INR GREATER than 1.7 or PT GREATER than 15 seconds, aPTT GREATER than 40 seconds   []   Heparin received within 48 hours, resulting in abnormally elevated aPTT GREATER than upper limit of normal   []   Current use of direct thrombin inhibitors or direct factor Xa inhibitors in the past 48 hours   []   Elevated blood pressure refractory to treatment (systolic GREATER than 185 mm/Hg or diastolic  GREATER than 110 mm/Hg   []   Suspected infective endocarditis and aortic arch dissection   []   Current use of therapeutic treatment dose of low-molecular-weight heparin (LMWH) within the previous 24 hours   []   Structural GI malignancy or bleed   Relative exclusion for all patients  []   Only minor non-disabling symptoms   []   Pregnancy   []   Seizure at onset with postictal residual neurological impairments   []   Major surgery or previous trauma within past 14 days   []   History of previous spontaneous ICH, intracranial neoplasm, or AV malformation   []   Postpartum (within previous 14 days)   []    Recent GI or urinary tract hemorrhage (within previous 21 days)   []   Recent acute MI (within previous 3 months)   []   History of un-ruptured intracranial aneurysm LESS than 10 mm   []   History of ruptured intracranial aneurysm   []   Blood glucose LESS than 50 mg/dL (2.7 mmol/L)   []   Dural puncture within the last 7 days   []   Known GREATER than 10 cerebral microbleeds   Additional exclusions for patients with symptoms onset between 3 and 4.5 hours.  []   Age > 80.   []   On any anticoagulants regardless of INR  >>> Warfarin (Coumadin), Heparin, Enoxaparin (Lovenox), fondaparinux (Arixtra), bivalirudin (Angiomax), Argatroban, dabigatran (Pradaxa), rivaroxaban (Xarelto), or apixaban (Eliquis)   []   Severe stroke (NIHSS > 25).   []   History of BOTH diabetes and previous ischemic stroke.   []   The risks and benefits have been discussed with the patient or family related to the administration of IV Alteplase for stroke symptoms.   []   I have discussed and reviewed the patient's case and imaging with the attending prior to IV Alteplase.    Time Alteplase administered       No past medical history on file.  No past surgical history on file.  No family history on file.  Social History     Socioeconomic History   • Marital status:      Spouse name: Not on file   • Number of children: Not on file   • Years of education: Not on file   • Highest education level: Not on file     Allergies   Allergen Reactions   • Prednisone Itching     Prior to Admission medications    Medication Sig Start Date End Date Taking? Authorizing Provider   Cariprazine HCl (Vraylar) 3 MG capsule capsule Take 3 mg by mouth Daily.    Brandon Bland MD   clonazePAM (KlonoPIN) 1 MG tablet Take 1 mg by mouth.    Brandon Bland MD   lisinopril (PRINIVIL,ZESTRIL) 20 MG tablet Take 20 mg by mouth Daily.    Brandon Bland MD   pantoprazole (PROTONIX) 40 MG EC tablet Take 40 mg by mouth.    Brandon Bland MD    tiZANidine (ZANAFLEX) 4 MG tablet Take 4 mg by mouth.    ProviderBrandon MD   topiramate (TOPAMAX) 100 MG tablet Take 50 mg by mouth.    ProviderBrandon MD   traZODone (DESYREL) 100 MG tablet Take 100 mg by mouth.    ProviderBrandon MD       [facility] Meds:  Scheduled- acetaminophen, 1,000 mg, Oral, Once  lactated ringers, 1,000 mL, Intravenous, Once  metoclopramide, 10 mg, Intravenous, Once      Infusions-     PRNs- sodium chloride    Functional Status Prior to Current Stroke/Lawrence Score: 0    NIH Stroke Scale  Time: 15:31 CDT  Person Administering Scale: Cuong Ibanez MD    1a  Level of consciousness: 0=alert; keenly responsive   1b. LOC questions:  0=Performs both tasks correctly   1c. LOC commands: 0=Performs both tasks correctly   2.  Best Gaze: 0=normal   3.  Visual: 0=No visual loss   4. Facial Palsy: 0=Normal symmetric movement   5a.  Motor left arm: 0=No drift, limb holds 90 (or 45) degrees for full 10 seconds   5b.  Motor right arm: 0=No drift, limb holds 90 (or 45) degrees for full 10 seconds   6a. motor left le=No drift, limb holds 90 (or 45) degrees for full 10 seconds   6b  Motor right le=No drift, limb holds 90 (or 45) degrees for full 10 seconds   7. Limb Ataxia: 0=Absent   8.  Sensory: 1=Mild to moderate sensory loss; patient feels pinprick is less sharp or is dull on the affected side; there is a loss of superficial pain with pinprick but patient is aware She is being touched   9. Best Language:  0=No aphasia, normal   10. Dysarthria: 0=Normal   11. Extinction and Inattention: 0=No abnormality    Total:   1       Results Reviewed:  I have personally reviewed current lab, radiology, and data and agree with results.               Assessment/Plan:  This is a 42-year-old right-handed white female with history of migraines, Ménière's disease status post cochlear implant, who last known normal was 10:30 AM CST  today presented with left temporal headache with numbness of  the left arm.    Diagnostic imaging-I personally reviewed all labs and imaging  CT head-show no acute abnormality  CT head and neck-showed no flow-limiting stenosis extra and intracranially.  MRI of the brain-patient cannot get due to cochlear implant    #1 Paresthesias-patient claims to have left temporal headache associated numbness, but dose not claim that this her typical migraine headache.  On exam, she has subjective left-sided numbness.  Based on the history, exam less likely this is a vascular ischemic event.  Treat her headache with migraine cocktail.  Repeat CT head tomorrow. Will check ESR.        Disposition-likely tomorrow, if her headache and numbness resolves.      Cuong Ibanez MD  September 23, 2020  15:31 CDT    Verbal consent taken.  Patient agreeable to be seen via telemedicine.    This was an audio and video enabled telemedicine encounter.

## 2020-09-24 ENCOUNTER — APPOINTMENT (OUTPATIENT)
Dept: CARDIOLOGY | Facility: HOSPITAL | Age: 45
End: 2020-09-24

## 2020-09-24 ENCOUNTER — APPOINTMENT (OUTPATIENT)
Dept: CT IMAGING | Facility: HOSPITAL | Age: 45
End: 2020-09-24

## 2020-09-24 VITALS
WEIGHT: 231.48 LBS | DIASTOLIC BLOOD PRESSURE: 66 MMHG | HEART RATE: 78 BPM | TEMPERATURE: 98 F | RESPIRATION RATE: 20 BRPM | SYSTOLIC BLOOD PRESSURE: 131 MMHG | BODY MASS INDEX: 43.7 KG/M2 | OXYGEN SATURATION: 98 % | HEIGHT: 61 IN

## 2020-09-24 PROBLEM — G43.409 HEMIPLEGIC MIGRAINE: Status: ACTIVE | Noted: 2020-09-24

## 2020-09-24 LAB
ALBUMIN SERPL-MCNC: 4.3 G/DL (ref 3.5–5.2)
ALBUMIN/GLOB SERPL: 2 G/DL
ALP SERPL-CCNC: 74 U/L (ref 39–117)
ALT SERPL W P-5'-P-CCNC: 15 U/L (ref 1–33)
ANION GAP SERPL CALCULATED.3IONS-SCNC: 10 MMOL/L (ref 5–15)
AST SERPL-CCNC: 10 U/L (ref 1–32)
BASOPHILS # BLD AUTO: 0.08 10*3/MM3 (ref 0–0.2)
BASOPHILS NFR BLD AUTO: 0.8 % (ref 0–1.5)
BH CV ECHO MEAS - ACS: 1.8 CM
BH CV ECHO MEAS - AO ISTHMUS: 2.2 CM
BH CV ECHO MEAS - AO MAX PG (FULL): 4.8 MMHG
BH CV ECHO MEAS - AO MAX PG: 9.2 MMHG
BH CV ECHO MEAS - AO MEAN PG (FULL): 2 MMHG
BH CV ECHO MEAS - AO MEAN PG: 5 MMHG
BH CV ECHO MEAS - AO ROOT AREA (BSA CORRECTED): 1.2
BH CV ECHO MEAS - AO ROOT AREA: 4.5 CM^2
BH CV ECHO MEAS - AO ROOT DIAM: 2.4 CM
BH CV ECHO MEAS - AO V2 MAX: 152 CM/SEC
BH CV ECHO MEAS - AO V2 MEAN: 108 CM/SEC
BH CV ECHO MEAS - AO V2 VTI: 28 CM
BH CV ECHO MEAS - ASC AORTA: 2.5 CM
BH CV ECHO MEAS - AVA(I,A): 2.1 CM^2
BH CV ECHO MEAS - AVA(I,D): 2.1 CM^2
BH CV ECHO MEAS - AVA(V,A): 2 CM^2
BH CV ECHO MEAS - AVA(V,D): 2 CM^2
BH CV ECHO MEAS - BSA(HAYCOCK): 2.2 M^2
BH CV ECHO MEAS - BSA: 2 M^2
BH CV ECHO MEAS - BZI_BMI: 42.4 KILOGRAMS/M^2
BH CV ECHO MEAS - BZI_METRIC_HEIGHT: 157.5 CM
BH CV ECHO MEAS - BZI_METRIC_WEIGHT: 105.2 KG
BH CV ECHO MEAS - EDV(CUBED): 114.8 ML
BH CV ECHO MEAS - EDV(MOD-SP2): 77.1 ML
BH CV ECHO MEAS - EDV(MOD-SP4): 72.8 ML
BH CV ECHO MEAS - EDV(TEICH): 110.7 ML
BH CV ECHO MEAS - EF(CUBED): 65.5 %
BH CV ECHO MEAS - EF(MOD-SP2): 59.8 %
BH CV ECHO MEAS - EF(MOD-SP4): 53.8 %
BH CV ECHO MEAS - EF(TEICH): 56.8 %
BH CV ECHO MEAS - ESV(CUBED): 39.7 ML
BH CV ECHO MEAS - ESV(MOD-SP2): 31 ML
BH CV ECHO MEAS - ESV(MOD-SP4): 33.6 ML
BH CV ECHO MEAS - ESV(TEICH): 47.8 ML
BH CV ECHO MEAS - FS: 29.8 %
BH CV ECHO MEAS - IVS/LVPW: 0.97
BH CV ECHO MEAS - IVSD: 0.68 CM
BH CV ECHO MEAS - LA DIMENSION: 3.4 CM
BH CV ECHO MEAS - LA/AO: 1.4
BH CV ECHO MEAS - LV DIASTOLIC VOL/BSA (35-75): 35.8 ML/M^2
BH CV ECHO MEAS - LV MASS(C)D: 106.7 GRAMS
BH CV ECHO MEAS - LV MASS(C)DI: 52.4 GRAMS/M^2
BH CV ECHO MEAS - LV MAX PG: 4.4 MMHG
BH CV ECHO MEAS - LV MEAN PG: 3 MMHG
BH CV ECHO MEAS - LV SYSTOLIC VOL/BSA (12-30): 16.5 ML/M^2
BH CV ECHO MEAS - LV V1 MAX: 105 CM/SEC
BH CV ECHO MEAS - LV V1 MEAN: 80.4 CM/SEC
BH CV ECHO MEAS - LV V1 VTI: 20.6 CM
BH CV ECHO MEAS - LVIDD: 4.9 CM
BH CV ECHO MEAS - LVIDS: 3.4 CM
BH CV ECHO MEAS - LVLD AP2: 7.4 CM
BH CV ECHO MEAS - LVLD AP4: 7.6 CM
BH CV ECHO MEAS - LVLS AP2: 6.4 CM
BH CV ECHO MEAS - LVLS AP4: 6.3 CM
BH CV ECHO MEAS - LVOT AREA (M): 2.8 CM^2
BH CV ECHO MEAS - LVOT AREA: 2.8 CM^2
BH CV ECHO MEAS - LVOT DIAM: 1.9 CM
BH CV ECHO MEAS - LVPWD: 0.7 CM
BH CV ECHO MEAS - MV A MAX VEL: 64.3 CM/SEC
BH CV ECHO MEAS - MV DEC SLOPE: 583 CM/SEC^2
BH CV ECHO MEAS - MV E MAX VEL: 78 CM/SEC
BH CV ECHO MEAS - MV E/A: 1.2
BH CV ECHO MEAS - MV MAX PG: 3.2 MMHG
BH CV ECHO MEAS - MV MEAN PG: 1 MMHG
BH CV ECHO MEAS - MV P1/2T MAX VEL: 91.7 CM/SEC
BH CV ECHO MEAS - MV P1/2T: 46.1 MSEC
BH CV ECHO MEAS - MV V2 MAX: 90 CM/SEC
BH CV ECHO MEAS - MV V2 MEAN: 51.2 CM/SEC
BH CV ECHO MEAS - MV V2 VTI: 19.3 CM
BH CV ECHO MEAS - MVA P1/2T LCG: 2.4 CM^2
BH CV ECHO MEAS - MVA(P1/2T): 4.8 CM^2
BH CV ECHO MEAS - MVA(VTI): 3 CM^2
BH CV ECHO MEAS - PA MAX PG: 4.2 MMHG
BH CV ECHO MEAS - PA V2 MAX: 102 CM/SEC
BH CV ECHO MEAS - RAP SYSTOLE: 5 MMHG
BH CV ECHO MEAS - RVDD: 2.5 CM
BH CV ECHO MEAS - RVSP: 23.8 MMHG
BH CV ECHO MEAS - SI(AO): 62.2 ML/M^2
BH CV ECHO MEAS - SI(CUBED): 36.9 ML/M^2
BH CV ECHO MEAS - SI(LVOT): 28.7 ML/M^2
BH CV ECHO MEAS - SI(MOD-SP2): 22.6 ML/M^2
BH CV ECHO MEAS - SI(MOD-SP4): 19.3 ML/M^2
BH CV ECHO MEAS - SI(TEICH): 30.9 ML/M^2
BH CV ECHO MEAS - SV(AO): 126.7 ML
BH CV ECHO MEAS - SV(CUBED): 75.1 ML
BH CV ECHO MEAS - SV(LVOT): 58.4 ML
BH CV ECHO MEAS - SV(MOD-SP2): 46.1 ML
BH CV ECHO MEAS - SV(MOD-SP4): 39.2 ML
BH CV ECHO MEAS - SV(TEICH): 62.9 ML
BH CV ECHO MEAS - TR MAX VEL: 217 CM/SEC
BILIRUB SERPL-MCNC: 0.3 MG/DL (ref 0–1.2)
BUN SERPL-MCNC: 21 MG/DL (ref 6–20)
BUN/CREAT SERPL: 20.8 (ref 7–25)
CALCIUM SPEC-SCNC: 9.3 MG/DL (ref 8.6–10.5)
CHLORIDE SERPL-SCNC: 103 MMOL/L (ref 98–107)
CHOLEST SERPL-MCNC: 158 MG/DL (ref 0–200)
CO2 SERPL-SCNC: 24 MMOL/L (ref 22–29)
CREAT SERPL-MCNC: 1.01 MG/DL (ref 0.57–1)
DEPRECATED RDW RBC AUTO: 42.3 FL (ref 37–54)
EOSINOPHIL # BLD AUTO: 0.09 10*3/MM3 (ref 0–0.4)
EOSINOPHIL NFR BLD AUTO: 0.8 % (ref 0.3–6.2)
ERYTHROCYTE [DISTWIDTH] IN BLOOD BY AUTOMATED COUNT: 13.6 % (ref 12.3–15.4)
GFR SERPL CREATININE-BSD FRML MDRD: 60 ML/MIN/1.73
GLOBULIN UR ELPH-MCNC: 2.2 GM/DL
GLUCOSE BLDC GLUCOMTR-MCNC: 101 MG/DL (ref 70–130)
GLUCOSE SERPL-MCNC: 98 MG/DL (ref 65–99)
HBA1C MFR BLD: 5.4 % (ref 4.8–5.6)
HCT VFR BLD AUTO: 38.4 % (ref 34–46.6)
HCYS SERPL-MCNC: 13.7 UMOL/L (ref 0–15)
HDLC SERPL-MCNC: 48 MG/DL (ref 40–60)
HGB BLD-MCNC: 12.7 G/DL (ref 12–15.9)
HOLD SPECIMEN: NORMAL
IMM GRANULOCYTES # BLD AUTO: 0.15 10*3/MM3 (ref 0–0.05)
IMM GRANULOCYTES NFR BLD AUTO: 1.4 % (ref 0–0.5)
LDLC SERPL CALC-MCNC: 74 MG/DL (ref 0–100)
LDLC/HDLC SERPL: 1.53 {RATIO}
LYMPHOCYTES # BLD AUTO: 2.78 10*3/MM3 (ref 0.7–3.1)
LYMPHOCYTES NFR BLD AUTO: 26.2 % (ref 19.6–45.3)
MAXIMAL PREDICTED HEART RATE: 178 BPM
MCH RBC QN AUTO: 28.8 PG (ref 26.6–33)
MCHC RBC AUTO-ENTMCNC: 33.1 G/DL (ref 31.5–35.7)
MCV RBC AUTO: 87.1 FL (ref 79–97)
MONOCYTES # BLD AUTO: 0.79 10*3/MM3 (ref 0.1–0.9)
MONOCYTES NFR BLD AUTO: 7.4 % (ref 5–12)
NEUTROPHILS NFR BLD AUTO: 6.74 10*3/MM3 (ref 1.7–7)
NEUTROPHILS NFR BLD AUTO: 63.4 % (ref 42.7–76)
NRBC BLD AUTO-RTO: 0 /100 WBC (ref 0–0.2)
PLATELET # BLD AUTO: 172 10*3/MM3 (ref 140–450)
PMV BLD AUTO: 11.5 FL (ref 6–12)
POTASSIUM SERPL-SCNC: 4.1 MMOL/L (ref 3.5–5.2)
PROT SERPL-MCNC: 6.5 G/DL (ref 6–8.5)
RBC # BLD AUTO: 4.41 10*6/MM3 (ref 3.77–5.28)
RPR SER QL: NORMAL
SODIUM SERPL-SCNC: 137 MMOL/L (ref 136–145)
STRESS TARGET HR: 151 BPM
TRIGL SERPL-MCNC: 182 MG/DL (ref 0–150)
VIT B12 BLD-MCNC: 421 PG/ML (ref 211–946)
VLDLC SERPL-MCNC: 36.4 MG/DL
WBC # BLD AUTO: 10.63 10*3/MM3 (ref 3.4–10.8)

## 2020-09-24 PROCEDURE — G0378 HOSPITAL OBSERVATION PER HR: HCPCS

## 2020-09-24 PROCEDURE — 97161 PT EVAL LOW COMPLEX 20 MIN: CPT

## 2020-09-24 PROCEDURE — 86146 BETA-2 GLYCOPROTEIN ANTIBODY: CPT | Performed by: NURSE PRACTITIONER

## 2020-09-24 PROCEDURE — 83921 ORGANIC ACID SINGLE QUANT: CPT | Performed by: NURSE PRACTITIONER

## 2020-09-24 PROCEDURE — 93306 TTE W/DOPPLER COMPLETE: CPT | Performed by: INTERNAL MEDICINE

## 2020-09-24 PROCEDURE — 92523 SPEECH SOUND LANG COMPREHEN: CPT | Performed by: SPEECH-LANGUAGE PATHOLOGIST

## 2020-09-24 PROCEDURE — 36415 COLL VENOUS BLD VENIPUNCTURE: CPT | Performed by: FAMILY MEDICINE

## 2020-09-24 PROCEDURE — 81240 F2 GENE: CPT | Performed by: NURSE PRACTITIONER

## 2020-09-24 PROCEDURE — 85730 THROMBOPLASTIN TIME PARTIAL: CPT | Performed by: NURSE PRACTITIONER

## 2020-09-24 PROCEDURE — 85670 THROMBIN TIME PLASMA: CPT | Performed by: NURSE PRACTITIONER

## 2020-09-24 PROCEDURE — 85305 CLOT INHIBIT PROT S TOTAL: CPT | Performed by: NURSE PRACTITIONER

## 2020-09-24 PROCEDURE — 99214 OFFICE O/P EST MOD 30 MIN: CPT | Performed by: NURSE PRACTITIONER

## 2020-09-24 PROCEDURE — 82607 VITAMIN B-12: CPT | Performed by: NURSE PRACTITIONER

## 2020-09-24 PROCEDURE — 85025 COMPLETE CBC W/AUTO DIFF WBC: CPT | Performed by: FAMILY MEDICINE

## 2020-09-24 PROCEDURE — 85610 PROTHROMBIN TIME: CPT | Performed by: NURSE PRACTITIONER

## 2020-09-24 PROCEDURE — 85300 ANTITHROMBIN III ACTIVITY: CPT | Performed by: NURSE PRACTITIONER

## 2020-09-24 PROCEDURE — 25010000002 INFLUENZA VAC SPLIT QUAD 0.5 ML SUSPENSION PREFILLED SYRINGE: Performed by: FAMILY MEDICINE

## 2020-09-24 PROCEDURE — 83036 HEMOGLOBIN GLYCOSYLATED A1C: CPT | Performed by: FAMILY MEDICINE

## 2020-09-24 PROCEDURE — 85303 CLOT INHIBIT PROT C ACTIVITY: CPT | Performed by: NURSE PRACTITIONER

## 2020-09-24 PROCEDURE — 82962 GLUCOSE BLOOD TEST: CPT

## 2020-09-24 PROCEDURE — 80053 COMPREHEN METABOLIC PANEL: CPT | Performed by: FAMILY MEDICINE

## 2020-09-24 PROCEDURE — G0008 ADMIN INFLUENZA VIRUS VAC: HCPCS | Performed by: FAMILY MEDICINE

## 2020-09-24 PROCEDURE — 93306 TTE W/DOPPLER COMPLETE: CPT

## 2020-09-24 PROCEDURE — 85302 CLOT INHIBIT PROT C ANTIGEN: CPT | Performed by: NURSE PRACTITIONER

## 2020-09-24 PROCEDURE — 85598 HEXAGNAL PHOSPH PLTLT NEUTRL: CPT | Performed by: NURSE PRACTITIONER

## 2020-09-24 PROCEDURE — 97165 OT EVAL LOW COMPLEX 30 MIN: CPT

## 2020-09-24 PROCEDURE — 85597 PHOSPHOLIPID PLTLT NEUTRALIZ: CPT | Performed by: NURSE PRACTITIONER

## 2020-09-24 PROCEDURE — 70450 CT HEAD/BRAIN W/O DYE: CPT

## 2020-09-24 PROCEDURE — 86592 SYPHILIS TEST NON-TREP QUAL: CPT | Performed by: NURSE PRACTITIONER

## 2020-09-24 PROCEDURE — 83090 ASSAY OF HOMOCYSTEINE: CPT | Performed by: NURSE PRACTITIONER

## 2020-09-24 PROCEDURE — 85613 RUSSELL VIPER VENOM DILUTED: CPT | Performed by: NURSE PRACTITIONER

## 2020-09-24 PROCEDURE — 80061 LIPID PANEL: CPT | Performed by: FAMILY MEDICINE

## 2020-09-24 PROCEDURE — 85732 THROMBOPLASTIN TIME PARTIAL: CPT | Performed by: NURSE PRACTITIONER

## 2020-09-24 PROCEDURE — 87536 HIV-1 QUANT&REVRSE TRNSCRPJ: CPT | Performed by: NURSE PRACTITIONER

## 2020-09-24 PROCEDURE — 86147 CARDIOLIPIN ANTIBODY EA IG: CPT | Performed by: NURSE PRACTITIONER

## 2020-09-24 PROCEDURE — 85306 CLOT INHIBIT PROT S FREE: CPT | Performed by: NURSE PRACTITIONER

## 2020-09-24 PROCEDURE — 90686 IIV4 VACC NO PRSV 0.5 ML IM: CPT | Performed by: FAMILY MEDICINE

## 2020-09-24 PROCEDURE — 81241 F5 GENE: CPT | Performed by: NURSE PRACTITIONER

## 2020-09-24 RX ORDER — ATORVASTATIN CALCIUM 40 MG/1
40 TABLET, FILM COATED ORAL NIGHTLY
Qty: 90 TABLET | Refills: 0 | Status: SHIPPED | OUTPATIENT
Start: 2020-09-24 | End: 2020-10-14

## 2020-09-24 RX ORDER — ASPIRIN 325 MG
325 TABLET ORAL DAILY
Qty: 90 TABLET | Refills: 0 | Status: SHIPPED | OUTPATIENT
Start: 2020-09-25 | End: 2020-10-14

## 2020-09-24 RX ORDER — ATORVASTATIN CALCIUM 40 MG/1
40 TABLET, FILM COATED ORAL NIGHTLY
Status: DISCONTINUED | OUTPATIENT
Start: 2020-09-24 | End: 2020-09-24 | Stop reason: HOSPADM

## 2020-09-24 RX ADMIN — ACETAMINOPHEN 650 MG: 325 TABLET, FILM COATED ORAL at 00:39

## 2020-09-24 RX ADMIN — ASPIRIN 325 MG: 325 TABLET ORAL at 08:07

## 2020-09-24 RX ADMIN — TOPIRAMATE 50 MG: 50 TABLET, FILM COATED ORAL at 08:07

## 2020-09-24 RX ADMIN — SUMATRIPTAN SUCCINATE 50 MG: 50 TABLET ORAL at 00:39

## 2020-09-24 RX ADMIN — SUMATRIPTAN SUCCINATE 50 MG: 50 TABLET ORAL at 08:07

## 2020-09-24 RX ADMIN — SODIUM CHLORIDE, PRESERVATIVE FREE 10 ML: 5 INJECTION INTRAVENOUS at 08:11

## 2020-09-24 RX ADMIN — INFLUENZA VIRUS VACCINE 0.5 ML: 15; 15; 15; 15 SUSPENSION INTRAMUSCULAR at 12:03

## 2020-09-24 RX ADMIN — SUMATRIPTAN SUCCINATE 50 MG: 50 TABLET ORAL at 04:02

## 2020-09-24 RX ADMIN — PANTOPRAZOLE SODIUM 40 MG: 40 TABLET, DELAYED RELEASE ORAL at 05:39

## 2020-09-24 RX ADMIN — ACETAMINOPHEN 650 MG: 325 TABLET, FILM COATED ORAL at 04:33

## 2020-09-24 RX ADMIN — ACETAMINOPHEN 650 MG: 325 TABLET, FILM COATED ORAL at 08:07

## 2020-09-24 NOTE — THERAPY DISCHARGE NOTE
Acute Care - Occupational Therapy Initial Evaluation/Discharge  [facility]     Patient Name: Cherri Boyer  : 1975  MRN: [id number]  Today's Date: 2020  Onset of Illness/Injury or Date of Surgery: 20  Date of Referral to OT: 20  Referring Physician: Dr. Holm      Admit Date: 2020       ICD-10-CM ICD-9-CM   1. Acute nonintractable headache, unspecified headache type  R51 784.0   2. Weakness  R53.1 780.79   3. Impaired mobility and activities of daily living  Z74.09 V49.89    Z78.9      Patient Active Problem List   Diagnosis   • Acute nonintractable headache     History reviewed. No pertinent past medical history.  History reviewed. No pertinent surgical history.       OT ASSESSMENT FLOWSHEET (last 12 hours)      OT Evaluation and Treatment     Row Name 20 0936                   OT Time and Intention    Subjective Information  no complaints  -AS        Document Type  evaluation  -AS        Mode of Treatment  co-treatment;physical therapy;occupational therapy  -AS        Total Minutes, Occupational Therapy  23  -AS        Patient Effort  excellent  -AS           General Information    Patient Profile Reviewed  yes  -AS        Onset of Illness/Injury or Date of Surgery  20  -AS        Referring Physician  Dr. Holm  -AS        Patient/Family/Caregiver Comments/Observations  no family present  -AS        Prior Level of Function  independent:;gait;transfer;ADL's;all household mobility;community mobility;home management;cooking;cleaning  -AS        Equipment Currently Used at Home  none  -AS        Existing Precautions/Restrictions  no known precautions/restrictions  -AS        Risks Reviewed  patient:;LOB;nausea/vomiting;change in vital signs;increased discomfort;dizziness;increased drainage;lines disloged  -AS        Benefits Reviewed  patient:;improve function;increase independence;increase strength;increase balance;decrease pain;decrease risk of DVT;improve skin  integrity;increase knowledge  -AS           Living Environment    Current Living Arrangements  home/apartment/condo  -AS        Lives With  spouse;other (see comments) and 3 dogs  -AS           Home Main Entrance    Number of Stairs, Main Entrance  none  -AS           Sensory    Additional Documentation  -- BUE light touch intact  -AS           Cognition    Affect/Mental Status (Cognitive)  WNL  -AS        Orientation Status (Cognition)  oriented x 4  -AS           Pain Scale: Numbers Pre/Post-Treatment    Pretreatment Pain Rating  0/10 - no pain  -AS           Range of Motion Comprehensive    General Range of Motion  bilateral upper extremity ROM WNL  -AS           Strength (Manual Muscle Testing)    Strength (Manual Muscle Testing)  strength is WNL;bilateral upper extremities grossly 4/5 throughout; symmetrical  -AS           Bed Mobility    Bed Mobility  sit-supine;supine-sit  -AS        Supine-Sit [place] (Bed Mobility)  modified independence  -AS        Assistive Device (Bed Mobility)  bed rails;head of bed elevated  -AS           Functional Mobility    Functional Mobility- Ind. Level  independent  -AS           Transfer Assessment/Treatment    Transfers  sit-stand transfer;stand-sit transfer  -AS           Transfers    Sit-Stand [place] (Transfers)  independent  -AS        Stand-Sit [place] (Transfers)  independent  -AS           Motor Skills    Motor Skills  coordination  -AS        Coordination  9 Hole Peg Test of Fine Motor Coordination Results RUPINDER WFL  -AS        Results, 9 Hole Peg Test of Fine Motor Coordination  L hand 26 sec, R hand 23 sec; Pt L hand dominant, however reports slower FMC at baseline due to past elbow sx  -AS           Activities of Daily Living    BADL Assessment/Intervention  lower body dressing  -AS           Lower Body Dressing Assessment/Training    [place] Level (Lower Body Dressing)  don;socks;set up  -AS        Position (Lower Body Dressing)  long sitting  -AS            Plan of Care Review    Plan of Care Reviewed With  patient  -AS           Vital Signs    Pre Systolic BP Rehab  117  -AS        Pre Treatment Diastolic BP  66  -AS        Post Systolic BP Rehab  124  -AS        Post Treatment Diastolic BP  72  -AS        Pretreatment Heart Rate (beats/min)  89  -AS        Posttreatment Heart Rate (beats/min)  88  -AS        Pre SpO2 (%)  98  -AS        O2 Delivery Pre Treatment  room air  -AS        Post SpO2 (%)  98  -AS        O2 Delivery Post Treatment  room air  -AS        Pre Patient Position  Sitting  -AS        Post Patient Position  Sitting  -AS           Positioning and Restraints    Pre-Treatment Position  in bed  -AS        Post Treatment Position  bed  -AS        In Bed  sitting EOB;call light within reach  -AS           Therapy Assessment/Plan (OT)    Date of Referral to OT  09/23/20  -AS        OT Diagnosis  impaired mobility and ADLs  -AS        Criteria for Skilled Therapeutic Interventions Met (OT)  no;skilled treatment is necessary  -AS        Therapy Frequency (OT)  evaluation only  -AS           Therapy Plan Review/Discharge Plan (OT)    Therapy Plan Review (OT)  evaluation/treatment results reviewed  -AS        Anticipated Discharge Disposition (OT)  home  -AS          User Key  (r) = Recorded By, (t) = Taken By, (c) = Cosigned By    Initials Name Effective Dates    AS Blanca Espinal, OT 07/05/20 -               OT Recommendation and Plan  Therapy Frequency (OT): evaluation only  Plan of Care Review  Plan of Care Reviewed With: patient  Outcome Summary: OT paul completed; co-eval with PT. Pt A&O x4. Pt reports she is back to her baseline function. Pt demo independence in ADLs and transfers/fxnl mobility. Pt demo symmetrical strength, intact BUE sensation, and WFL coordination. No further skilled OT needs indicated at this time. D/c OT; rec home at discharge.  Plan of Care Reviewed With: patient  Outcome Summary: OT paul completed; co-eval with PT. Pt  A&O x4. Pt reports she is back to her baseline function. Pt demo independence in ADLs and transfers/fxnl mobility. Pt demo symmetrical strength, intact BUE sensation, and WFL coordination. No further skilled OT needs indicated at this time. D/c OT; rec home at discharge.         Outcome Measures     Row Name 09/24/20 0936             9 Hole Peg    9-Hole Peg Left  26 sec  -AS      9-Hole Peg Right  23 sec  -AS         How much help from another is currently needed...    Putting on and taking off regular lower body clothing?  4  -AS      Bathing (including washing, rinsing, and drying)  4  -AS      Toileting (which includes using toilet bed pan or urinal)  4  -AS      Putting on and taking off regular upper body clothing  4  -AS      Taking care of personal grooming (such as brushing teeth)  4  -AS      Eating meals  4  -AS      AM-PAC 6 Clicks Score (OT)  24  -AS         Modified Baring Scale    Pre-Stroke Modified Baring Scale  0 - No Symptoms at all.  -AS      Modified Tamia Scale  0 - No Symptoms at all.  -AS         Functional Assessment    Outcome Measure Options  AM-PAC 6 Clicks Daily Activity (OT);Modified Tamia;9 Hole Peg  -AS        User Key  (r) = Recorded By, (t) = Taken By, (c) = Cosigned By    Initials Name Provider Type    AS Blanca Espinal OT Occupational Therapist          Time Calculation:   Time Calculation- OT     Row Name 09/24/20 1107             Time Calculation- OT    OT Start Time  0935  -AS      OT Stop Time  0958  -AS      OT Time Calculation (min)  23 min  -AS      OT Received On  09/24/20  -AS        User Key  (r) = Recorded By, (t) = Taken By, (c) = Cosigned By    Initials Name Provider Type    AS Blanca Espinal OT Occupational Therapist          Therapy Charges for Today     Code Description Service Date Service Provider Modifiers Qty    [id number]  OT EVAL LOW COMPLEXITY 2 9/24/2020 Blanca Espinal OT GO 1               OT Discharge Summary  Anticipated Discharge  Disposition (OT): home    Blanca Espinal, OT  9/24/2020

## 2020-09-24 NOTE — PLAN OF CARE
Problem: Adult Inpatient Plan of Care  Goal: Plan of Care Review  Outcome: Ongoing, Progressing  Flowsheets (Taken 9/24/2020 0820)  Progress: improving  Plan of Care Reviewed With: patient  Outcome Summary: speech evaluation completed. pt scored 26/30.  she does not present with any lingering affects from left side numbness this date.  she is appropriate and likely at her baseline for cognitive linguistic skills.  no treatment recommended at this time,.

## 2020-09-24 NOTE — PROGRESS NOTES
"Stroke Progress Note       Chief Complaint: Left-side paresthesias    Subjective     HPI: Pt is a 44-yr-old right-handed white female with a known diagnosis of migraines, Meniere's disease s/p cochlear implant who presented yesterday with left temporal lobe headache and left arm numbness. Pt states feeling \"a lot better today.\" Also states feeling back to baseline with no left-sided numbness. Pt stated that Imitrex was effective for her headache. Her NIHSS is 0 this morning.     Review of Systems   Constitutional: Negative.    Eyes: Negative.    Respiratory: Negative.    Cardiovascular: Negative.    Endocrine: Negative.    Genitourinary: Negative.    Musculoskeletal: Negative.    Neurological: Positive for headaches.        Pt states Imitrex is effective for headache.   Hematological: Negative.    Psychiatric/Behavioral: Negative.         Objective      Temp:  [97.6 °F (36.4 °C)-98.6 °F (37 °C)] 97.6 °F (36.4 °C)  Heart Rate:  [] 81  Resp:  [16-20] 18  BP: (109-129)/(65-83) 121/77    Neurological Exam  Mental Status  Awake and alert. Oriented to person, place, time and situation. Recent and remote memory are intact. Able to copy figure. Language is fluent with no aphasia. Attention and concentration are normal. Fund of knowledge is appropriate for level of education.    Cranial Nerves  CN II: Visual acuity is normal. Visual fields full to confrontation.  CN III, IV, VI: Extraocular movements intact bilaterally. Normal lids and orbits bilaterally. Pupils equal round and reactive to light bilaterally.  CN V: Facial sensation is normal.  CN VII: Full and symmetric facial movement.  CN IX, X: Palate elevates symmetrically. Normal gag reflex.  CN XI: Shoulder shrug strength is normal.  CN XII: Tongue midline without atrophy or fasciculations.    Motor   Strength is 5/5 throughout all four extremities.    Sensory  Sensation is intact to light touch, pinprick, vibration and proprioception in all four " extremities.    Reflexes  Not tested..    Coordination  Finger-to-nose, rapid alternating movements and heel-to-shin normal bilaterally without dysmetria.    Gait  Casual gait is normal including stance, stride, and arm swing.      Physical Exam  Vitals signs and nursing note reviewed.   Constitutional:       General: She is awake.      Appearance: Normal appearance.   HENT:      Head: Normocephalic and atraumatic.   Eyes:      General: Lids are normal.      Extraocular Movements: Extraocular movements intact.      Pupils: Pupils are equal, round, and reactive to light.   Neck:      Musculoskeletal: Normal range of motion.   Cardiovascular:      Rate and Rhythm: Normal rate and regular rhythm.   Pulmonary:      Effort: Pulmonary effort is normal.   Musculoskeletal: Normal range of motion.   Skin:     General: Skin is warm and dry.   Neurological:      General: No focal deficit present.      Mental Status: She is alert and oriented to person, place, and time.      Coordination: Coordination is intact.      Deep Tendon Reflexes: Strength normal.   Psychiatric:         Mood and Affect: Mood normal.         Results Review:    I reviewed the patient's new clinical results.    Lab Results (last 24 hours)     Procedure Component Value Units Date/Time    Comprehensive Metabolic Panel [[id number]]  (Abnormal) Collected: 09/24/20 0442    Specimen: Blood Updated: 09/24/20 0531     Glucose 98 mg/dL      BUN 21 mg/dL      Creatinine 1.01 mg/dL      Sodium 137 mmol/L      Potassium 4.1 mmol/L      Chloride 103 mmol/L      CO2 24.0 mmol/L      Calcium 9.3 mg/dL      Total Protein 6.5 g/dL      Albumin 4.30 g/dL      ALT (SGPT) 15 U/L      AST (SGOT) 10 U/L      Alkaline Phosphatase 74 U/L      Total Bilirubin 0.3 mg/dL      eGFR Non African Amer 60 mL/min/1.73      Globulin 2.2 gm/dL      A/G Ratio 2.0 g/dL      BUN/Creatinine Ratio 20.8     Anion Gap 10.0 mmol/L     Narrative:      GFR Normal >60  Chronic Kidney Disease  <60  Kidney Failure <15      Lipid Panel [[id number]]  (Abnormal) Collected: 09/24/20 0442    Specimen: Blood Updated: 09/24/20 0531     Total Cholesterol 158 mg/dL      Triglycerides 182 mg/dL      HDL Cholesterol 48 mg/dL      LDL Cholesterol  74 mg/dL      VLDL Cholesterol 36.4 mg/dL      LDL/HDL Ratio 1.53    Narrative:      Cholesterol Reference Ranges  (U.S. Department of Health and Human Services ATP III Classifications)    Desirable          <200 mg/dL  Borderline High    200-239 mg/dL  High Risk          >240 mg/dL      Triglyceride Reference Ranges  (U.S. Department of Health and Human Services ATP III Classifications)    Normal           <150 mg/dL  Borderline High  150-199 mg/dL  High             200-499 mg/dL  Very High        >500 mg/dL    HDL Reference Ranges  (U.S. Department of Health and Human Services ATP III Classifcations)    Low     <40 mg/dl (major risk factor for CHD)  High    >60 mg/dl ('negative' risk factor for CHD)        LDL Reference Ranges  (U.S. Department of Health and Human Services ATP III Classifcations)    Optimal          <100 mg/dL  Near Optimal     100-129 mg/dL  Borderline High  130-159 mg/dL  High             160-189 mg/dL  Very High        >189 mg/dL    Hemoglobin A1c [[id number]]  (Normal) Collected: 09/24/20 0442    Specimen: Blood Updated: 09/24/20 0522     Hemoglobin A1C 5.40 %     Narrative:      Hemoglobin A1C Ranges:    Increased Risk for Diabetes  5.7% to 6.4%  Diabetes                     >= 6.5%  Diabetic Goal                < 7.0%    CBC Auto Differential [[id number]]  (Abnormal) Collected: 09/24/20 0442    Specimen: Blood Updated: 09/24/20 0510     WBC 10.63 10*3/mm3      RBC 4.41 10*6/mm3      Hemoglobin 12.7 g/dL      Hematocrit 38.4 %      MCV 87.1 fL      MCH 28.8 pg      MCHC 33.1 g/dL      RDW 13.6 %      RDW-SD 42.3 fl      MPV 11.5 fL      Platelets 172 10*3/mm3      Neutrophil % 63.4 %      Lymphocyte % 26.2 %      Monocyte % 7.4 %      Eosinophil % 0.8 %       Basophil % 0.8 %      Immature Grans % 1.4 %      Neutrophils, Absolute 6.74 10*3/mm3      Lymphocytes, Absolute 2.78 10*3/mm3      Monocytes, Absolute 0.79 10*3/mm3      Eosinophils, Absolute 0.09 10*3/mm3      Basophils, Absolute 0.08 10*3/mm3      Immature Grans, Absolute 0.15 10*3/mm3      nRBC 0.0 /100 WBC     POC Glucose Once [[id number]]  (Normal) Collected: 09/24/20 0038    Specimen: Blood Updated: 09/24/20 0051     Glucose 101 mg/dL     COVID PRE-OP / PRE-PROCEDURE SCREENING ORDER (NO ISOLATION) - Swab, Nasopharynx [[id number]]  (Normal) Collected: 09/23/20 1750    Specimen: Swab from Nasopharynx Updated: 09/23/20 2141    Narrative:      The following orders were created for panel order COVID PRE-OP / PRE-PROCEDURE SCREENING ORDER (NO ISOLATION) - Swab, Nasopharynx.  Procedure                               Abnormality         Status                     ---------                               -----------         ------                     COVID-19, [facility]...[[id number]]  Normal              Final result                 Please view results for these tests on the individual orders.    COVID-19, [facility] IN-HOUSE, NP SWAB IN TRANSPORT MEDIA 8-10 HR TAT - Swab, Nasopharynx [[id number]]  (Normal) Collected: 09/23/20 1750    Specimen: Swab from Nasopharynx Updated: 09/23/20 2141     COVID19 Not Detected    Narrative:      Testing performed by Real Time RT-PCR  This test has not been approved by the [facility] but is authorized under the Emergency Use Act (EUA)    Fact sheet for providers: https://www.fda.gov/media/110590/download    Fact sheet for patients: https://www.fda.gov/media/576330/download        Sedimentation Rate [[id number]]  (Normal) Collected: 09/23/20 1300    Specimen: Blood Updated: 09/23/20 1623     Sed Rate 9 mm/hr     Pregnancy, Urine - Urine, Clean Catch [[id number]]  (Normal) Collected: 09/23/20 1325    Specimen: Urine, Clean Catch Updated: 09/23/20 1507     HCG, Urine QL Negative    C-reactive  Protein [[id number]]  (Abnormal) Collected: 09/23/20 1300    Specimen: Blood Updated: 09/23/20 1507     C-Reactive Protein 0.76 mg/dL     Urinalysis With Microscopic If Indicated (No Culture) - Urine, Clean Catch [[id number]]  (Abnormal) Collected: 09/23/20 1325    Specimen: Urine, Clean Catch Updated: 09/23/20 1351     Color, UA Yellow     Appearance, UA Cloudy     pH, UA 5.5     Specific Gravity, UA 1.018     Glucose, UA Negative     Ketones, UA Negative     Bilirubin, UA Negative     Blood, UA Negative     Protein, UA Negative     Leuk Esterase, UA Moderate (2+)     Nitrite, UA Negative     Urobilinogen, UA 0.2 E.U./dL    Urinalysis, Microscopic Only - Urine, Clean Catch [[id number]]  (Abnormal) Collected: 09/23/20 1325    Specimen: Urine, Clean Catch Updated: 09/23/20 1351     RBC, UA 6-12 /HPF      WBC, UA 6-12 /HPF      Bacteria, UA 1+ /HPF      Squamous Epithelial Cells, UA 3-5 /HPF      Hyaline Casts, UA 3-6 /LPF      Methodology Automated Microscopy    Comprehensive Metabolic Panel [[id number]]  (Abnormal) Collected: 09/23/20 1300    Specimen: Blood Updated: 09/23/20 1327     Glucose 102 mg/dL      BUN 21 mg/dL      Creatinine 0.90 mg/dL      Sodium 137 mmol/L      Potassium 3.6 mmol/L      Chloride 104 mmol/L      CO2 21.0 mmol/L      Calcium 9.6 mg/dL      Total Protein 6.9 g/dL      Albumin 4.70 g/dL      ALT (SGPT) 17 U/L      AST (SGOT) 13 U/L      Alkaline Phosphatase 84 U/L      Total Bilirubin <0.2 mg/dL      eGFR Non African Amer 69 mL/min/1.73      Globulin 2.2 gm/dL      A/G Ratio 2.1 g/dL      BUN/Creatinine Ratio 23.3     Anion Gap 12.0 mmol/L     Narrative:      GFR Normal >60  Chronic Kidney Disease <60  Kidney Failure <15      CBC & Differential [[id number]]  (Abnormal) Collected: 09/23/20 1300    Specimen: Blood Updated: 09/23/20 1311    Narrative:      The following orders were created for panel order CBC & Differential.  Procedure                               Abnormality         Status                      ---------                               -----------         ------                     CBC Auto Differential[[id number]]        Abnormal            Final result                 Please view results for these tests on the individual orders.    CBC Auto Differential [[id number]]  (Abnormal) Collected: 09/23/20 1300    Specimen: Blood Updated: 09/23/20 1311     WBC 12.12 10*3/mm3      RBC 4.43 10*6/mm3      Hemoglobin 12.7 g/dL      Hematocrit 37.7 %      MCV 85.1 fL      MCH 28.7 pg      MCHC 33.7 g/dL      RDW 13.5 %      RDW-SD 41.8 fl      MPV 11.3 fL      Platelets 183 10*3/mm3      Neutrophil % 75.3 %      Lymphocyte % 18.2 %      Monocyte % 4.3 %      Eosinophil % 0.4 %      Basophil % 0.6 %      Immature Grans % 1.2 %      Neutrophils, Absolute 9.13 10*3/mm3      Lymphocytes, Absolute 2.20 10*3/mm3      Monocytes, Absolute 0.52 10*3/mm3      Eosinophils, Absolute 0.05 10*3/mm3      Basophils, Absolute 0.07 10*3/mm3      Immature Grans, Absolute 0.15 10*3/mm3      nRBC 0.0 /100 WBC         Ct Angiogram Head    Result Date: 9/23/2020  CT neck angiogram within normal limits. No aneurysm, dissection, or focal stenosis. Left thyroid nodule measuring 1.1 cm. Consider nonemergent outpatient thyroid ultrasound for further evaluation. Appearance of biapical emphysema. Electronically signed by:  Christophe Owens MD  9/23/2020 4:07 PM CDT Workstation: [id number]    Ct Head Without Contrast    Result Date: 9/23/2020  No evidence of intracranial hemorrhage, mass effect or large acute infarct. Electronically signed by:  Tommy Lynne MD  9/23/2020 3:30 PM CDT Workstation: [id number]    Ct Angiogram Neck    Result Date: 9/23/2020  CT neck angiogram within normal limits. No aneurysm, dissection, or focal stenosis. Left thyroid nodule measuring 1.1 cm. Consider nonemergent outpatient thyroid ultrasound for further evaluation. Appearance of biapical emphysema. Electronically signed by:  Christophe Owens MD  " 9/23/2020 4:07 PM CDT Workstation: [id number]    Xr Chest 1 View    Result Date: 9/23/2020  Mild streaky right base atelectasis versus infiltrate Electronically signed by:  Yuridia Guillen MD  9/23/2020 3:07 PM CDT Workstation: [id number]           Assessment/Plan     Assessment/Plan: Pt is a 44-yr-old right-handed white female with a known diagnosis of migraines, Meniere's disease s/p cochlear implant who presented yesterday with left temporal lobe headache and left arm numbness. Pt states feeling \"a lot better today.\" Also states feeling back to baseline with no left-sided numbness. Pt stated that Imitrex was effective for her headache. Her NIHSS is 0 this morning.   1. Left-side numbness and headache- Resolved. Cont Imitrex as needed. Repeat CT scan this morning. Start aspirin 325 mg andLipitor 40 mg/day for stroke prevention, LDL is 74. Checking hypercoagulable bloods studies and echo pending.  2. Essential hypertension- -129, cont current treatment.  2. Activity- Okay to work with PT/OT. Pt up with standby assist with no issues.   3. Diet- Cont Heart-healthy diet.   Case was discussed with pt, Dr. Soto, Hospitalist team, and nursing. Will sign off. Okay to d/c after echo.        Patient Active Problem List   Diagnosis   • Acute nonintractable headache           ANGEL Santos  09/24/20  07:45 CDT    "

## 2020-09-24 NOTE — PLAN OF CARE
Problem: Adult Inpatient Plan of Care  Goal: Plan of Care Review  Outcome: Ongoing, Progressing  Flowsheets  Taken 9/24/2020 1105  Plan of Care Reviewed With: patient  Outcome Summary:   OT paul completed   co-eval with PT. Pt A&O x4. Pt reports she is back to her baseline function. Pt demo independence in ADLs and transfers/fxnl mobility. Pt demo symmetrical strength, intact BUE sensation, and WFL coordination. No further skilled OT needs indicated at this time. D/c OT   rec home at discharge.  Taken 9/24/2020 0936  Plan of Care Reviewed With: patient

## 2020-09-24 NOTE — DISCHARGE INSTR - DIET
Resume normal diet with modifications for heart healthy diet. Limit sodium and saturated fat intake.

## 2020-09-24 NOTE — THERAPY DISCHARGE NOTE
Patient Name: Cherri Boyer  : 1975    MRN: [id number]                              Today's Date: 2020       Admit Date: 2020    Visit Dx:     ICD-10-CM ICD-9-CM   1. Acute nonintractable headache, unspecified headache type  R51 784.0   2. Weakness  R53.1 780.79     Patient Active Problem List   Diagnosis   • Acute nonintractable headache     History reviewed. No pertinent past medical history.  History reviewed. No pertinent surgical history.  General Information     Row Name 20          Physical Therapy Time and Intention    Document Type  evaluation  -CZ     Mode of Treatment  co-treatment;physical therapy;occupational therapy  -CZ     Row Name 20          General Information    Prior Level of Function  independent:;all household mobility;community mobility  -CZ     Existing Precautions/Restrictions  no known precautions/restrictions  -CZ     Barriers to Rehab  none identified  -CZ     Row Name 20          Living Environment    Lives With  spouse  -CZ     Row Name 20          Home Main Entrance    Number of Stairs, Main Entrance  none  -CZ     Row Name 2035          Stairs Within Home, Primary    Stairs, Within Home, Primary  No DME. Tub-shower combo.  -CZ     Number of Stairs, Within Home, Primary  none  -CZ     Row Name 20          Cognition    Orientation Status (Cognition)  oriented x 4  -CZ     Row Name 20          Safety Issues, Functional Mobility    Impairments Affecting Function (Mobility)  pain  -CZ     Comment, Safety Issues/Impairments (Mobility)  Chronic R hip pain.  -CZ       User Key  (r) = Recorded By, (t) = Taken By, (c) = Cosigned By    Initials Name Provider Type    CZ Maximiliano Rollins, PT Physical Therapist        Mobility     Row Name 20          Bed Mobility    Bed Mobility  sit-supine;supine-sit  -CZ     Supine-Sit [place] (Bed Mobility)  modified independence  -CZ      Sit-Supine [place] (Bed Mobility)  modified independence  -     Assistive Device (Bed Mobility)  bed rails;head of bed elevated  -[facility] Name 09/24/20 0935          Sit-Stand Transfer    Sit-Stand [place] (Transfers)  independent  -CZ     Row Name 09/24/20 0935          Gait/Stairs (Locomotion)    [place] Level (Gait)  independent  -     Distance in Feet (Gait)  150'x1.  -CZ     Comment (Gait/Stairs)  Slow latrell, no LOB, good gait mechanics.  -CZ       User Key  (r) = Recorded By, (t) = Taken By, (c) = Cosigned By    Initials Name Provider Type    Maximiliano Ellis, PT Physical Therapist        Obj/Interventions     [facility] Name 09/24/20 0935          Range of Motion Comprehensive    General Range of Motion  bilateral lower extremity ROM WNL  -CZ     Row Name 09/24/20 0935          Strength Comprehensive (MMT)    Comment, General Manual Muscle Testing (MMT) Assessment  BLEs: 4/5 grossly,  except hip adduction: 3+/5 bilaterally.  -CZ     Row Name 09/24/20 0935          Sensory Assessment (Somatosensory)    Sensory Assessment (Somatosensory)  LE sensation intact  -       User Key  (r) = Recorded By, (t) = Taken By, (c) = Cosigned By    Initials Name Provider Type    Maximiliano Ellis, PT Physical Therapist        Goals/Plan    No documentation.       Clinical Impression     Row Name 09/24/20 0935          Pain    Additional Documentation  Pain Scale: Numbers Pre/Post-Treatment (Group)  -CZ     Row Name 09/24/20 0935          Pain Scale: Numbers Pre/Post-Treatment    Pretreatment Pain Rating  0/10 - no pain  -CZ     Posttreatment Pain Rating  5/10  -CZ     Pain Location - Side  Right  -CZ     Pain Location  hip  -CZ     Pain Intervention(s)  Repositioned;Distraction  -CZ     Row Name 09/24/20 0935          Plan of Care Review    Plan of Care Reviewed With  patient  -CZ     Row Name 09/24/20 0935          Therapy Assessment/Plan (PT)    Criteria for Skilled Interventions Met (PT)  no;no  problems identified which require skilled intervention  -CZ     Predicted Duration of Therapy Intervention (PT)  Likely discharge today.  -CZ     Row Name 09/24/20 0935          Vital Signs    Pre Systolic BP Rehab  117  -CZ     Pre Treatment Diastolic BP  66  -CZ     Post Systolic BP Rehab  124  -CZ     Post Treatment Diastolic BP  72  -CZ     Pretreatment Heart Rate (beats/min)  89  -CZ     Posttreatment Heart Rate (beats/min)  88  -CZ     Pre SpO2 (%)  98  -CZ     O2 Delivery Pre Treatment  room air  -CZ     Post SpO2 (%)  98  -CZ     O2 Delivery Post Treatment  room air  -CZ     Pre Patient Position  Sitting  -CZ     Post Patient Position  Sitting  -CZ     Row Name 09/24/20 0935          Positioning and Restraints    Pre-Treatment Position  in bed  -CZ     Post Treatment Position  bed  -CZ     In Bed  sitting EOB  -CZ       User Key  (r) = Recorded By, (t) = Taken By, (c) = Cosigned By    Initials Name Provider Type    CZ Maximiliano Rollins, PT Physical Therapist        Outcome Measures     Row Name 09/24/20 0935          How much help from another person do you currently need...    Turning from your back to your side while in flat bed without using bedrails?  4  -CZ     Moving from lying on back to sitting on the side of a flat bed without bedrails?  4  -CZ     Moving to and from a bed to a chair (including a wheelchair)?  4  -CZ     Standing up from a chair using your arms (e.g., wheelchair, bedside chair)?  4  -CZ     Climbing 3-5 steps with a railing?  4  -CZ     To walk in hospital room?  4  -CZ     AM-PAC 6 Clicks Score (PT)  24  -CZ     Row Name 09/24/20 0935          Modified Delta Scale    Pre-Stroke Modified Delta Scale  0 - No Symptoms at all.  -CZ     Modified [place] Scale  0 - No Symptoms at all.  -CZ     Row Name 09/24/20 0935          Tinetti Assessment    Sitting Balance  1  -CZ     Arises  2  -CZ     Attempts to Rise  2  -CZ     Immediate Standing Balance (first 5 sec)  2  -CZ     Standing  "Balance  1  -CZ     Sternal Nudge (feet close together)  2  -CZ     Eyes Closed (feet close together)  1  -CZ     Turning 360 Degrees- Steps  1  -CZ     Turning 360 Degrees- Steadiness  1  -CZ     Sitting Down  2  -CZ     Tinetti Balance Score  15  -CZ     Gait Initiation (immediate after told \"go\")  1  -CZ     Step Length- Right Swing  1  -CZ     Step Length- Left Swing  1  -CZ     Foot Clearance- Right Foot  1  -CZ     Foot Clearance- Left Foot  1  -CZ     Step Symmetry  1  -CZ     Step Continuity  1  -CZ     Path (excursion)  2  -CZ     Trunk  2  -CZ     Base of Support  1  -CZ     Gait Score  12  -CZ     T[place]etti Total Score  27  -CZ     Row Name 09/24/20 0919          Functional Assessment    Outcome Measure Options  AM-PAC 6 Clicks Basic Mobility (PT);Modified [place];Tinetti  -CZ       User Key  (r) = Recorded By, (t) = Taken By, (c) = Cosigned By    Initials Name Provider Type    Maximiliano Ellis, PT Physical Therapist        Physical Therapy Education                 Title: PT OT SLP Therapies (Resolved)     Topic: Physical Therapy (Resolved)     Point: Precautions (Resolved)     Learning Progress Summary           Patient Acceptance, E, VU by  at 9/24/2020 1031    Comment: Tinetti assessed: 27/28 or low fall risk, AD not necessary.                               User Key     Initials Effective Dates Name Provider Type Discipline     04/03/18 -  Maximiliano Rollins, PT Physical Therapist PT              PT Recommendation and Plan     Plan of Care Reviewed With: patient  Outcome Summary: Initial PT evaluation complete; co-evaluation with OT.  Patient is alert and very cooperative.  She demonstrates (I) with bed mobility, transfers and gait, ambulating 150' x 1 without an AD, no LOB, slow latrell.  Tinetti assessed: 27/28 or low fall risk, AD not necessary. Modified [place], pre: 0, post: 0.  Patient is at PLOF, no abnormal tone noted, RLE slightly weaker d/t chronic hip DJD, no coordination issues.   Skilled " PT discharged.     Time Calculation:   PT Charges     Row Name 09/24/20 1037             Time Calculation    Start Time  0935  -CZ      Stop Time  0958  -CZ      Time Calculation (min)  23 min  -CZ      PT Received On  09/24/20  -CZ      PT Goal Re-Cert Due Date  09/24/20  -CZ        User Key  (r) = Recorded By, (t) = Taken By, (c) = Cosigned By    Initials Name Provider Type    CZ Maximiliano Rollins, PT Physical Therapist        Therapy Charges for Today     Code Description Service Date Service Provider Modifiers Qty    92551095100 HC PT EVAL LOW COMPLEXITY 2 9/24/2020 Maximiliano Rollins, PT GP 1          PT G-Codes  Outcome Measure Options: AM-PAC 6 Clicks Basic Mobility (PT), Modified Shaun Cantrell  AM-PAC 6 Clicks Score (PT): 24  Modified [place] Scale: 0 - No Symptoms at all.  Tinetti Total Score: 27    PT Discharge Summary  Anticipated Discharge Disposition (PT): home    Maximiliano Rollins PT  9/24/2020

## 2020-09-24 NOTE — DISCHARGE INSTR - APPOINTMENTS
Follow up via telehealth appointment with Naomi Doan MD on 10/1/2020 at 10:30am. Return to ER immediately if symptoms return or new symptoms appear.

## 2020-09-24 NOTE — CONSULTS
[place]:  45 yo F with sudden onset of L headache radiating down to shoulder not past elbow, associated with nausea + weakness in L face and UE.  Began at 9/23 around 10:00, with pain improving and now intermittent.  Brought to ER by family, BP normal in ER.    ROS:  No numbness / clumsiness, double vision, vision loss, chest pain / pressure, SOB, sweatiness  + palpitations    MEDHX:  Meniere's disease, cochlear implant, HTN with chronic renal failure;  Bipolar disorder    MEDS home:  Triamterene for Meniere's, lisijnopril, lamotrigine;  Topamax;  klonapin    ALL:  prednisone    SOCHX:  No T,E,D    FAMHX:  Depression, anxiety, COPD    PE:  AF VSS  Awake, alert, attentive, NAD;  Comprehends following VOx3, produces normally and appropriately, no dysarthria  Fields full, EOMI, no N, gaze conjugate and crossing midline  Face symmetric, tongue midline, shrug 5/5  Strength 5/5 throughout, no UE drift  Sensation intact to touch B F-A-L  No jerking, tremor, dyscoordination  Stance stable, gait narrow and stable    LABS:  CTA head & neck negative    ASSESSMENT:  45 yo F with possible ischemic stroke, clinically resolved    PLAN  1)  Aspirin 325mg atorvastatin 40mg qd       A)  Change aspirin to second-generation anticoagulant if atrial fibrillation, cardiac thrombus, or hypercoaguability abnormality identified  2)  Check basic stroke serologies:  ESR, CRP, HIV, RPR, fasting lipid panel, hemoglobin A1c, vitamin B12 level, homocysteine, methylmalonic acid  3)  Check hypercoaguability studeis:  Protein C & S levels and activities, antithormbin III level and activities, lupus anticoagulant panel, factor V leiden mutation, prothrombin gene mutation  4)  Check transthoracic echocardiogram  5)  Patient instructed to return to ER immediately with new symptoms  6)  Agreeable to DC home after completion of diagnostic testing, f/u with outpatient PCP to review results    60 min eval, > 50% with patient

## 2020-09-24 NOTE — PLAN OF CARE
Goal Outcome Evaluation:  Plan of Care Reviewed With: patient  Progress: improving     Pt. To be discharged today. All neuro symptoms resolved at this time. NIH 0. Pt. To follow up with PCP in 1 week.

## 2020-09-24 NOTE — DISCHARGE SUMMARY
[facility] Medicine Services  DISCHARGE SUMMARY       Date of Admission: 9/23/2020  Date of Discharge:  9/24/2020  Primary Care Physician: Naomi Doan MD    Presenting Problem/History of Present Illness:  Weakness [R53.1]  Acute nonintractable headache, unspecified headache type [R51]     Final Discharge Diagnoses:  Active Hospital Problems    Diagnosis   • **Hemiplegic migraine   • Acute nonintractable headache       Consults:   Consults     Date and Time Order Name Status Description    9/23/2020 1501 Inpatient Neurology Consult Stroke Completed           Procedures Performed: None                Pertinent Test Results:   Procedure Component Value Units Date/Time   CT Head Without Contrast [[id number]] Eran as Reviewed   Order Status: Completed Collected: 09/24/20 0851    Updated: 09/24/20 0936   Narrative:     Noncontrast CT examination of the brain.     INDICATION: Stroke protocol, weakness.       Technique: Axial 5 mm contiguous images with brain parenchymal   and bone windows     This exam was performed according to our departmental   dose-optimization program, which includes automated exposure   control, adjustment of the mA and/or kV according to patient size   and/or use of iterative reconstruction technique.     Prior relevant examination: CT brain September 23, 2020.     Brain parenchyma appears within normal limits. Ventricles are   within normal limits in size. No evidence of abnormal mass or   calcification is seen. No evidence of acute hemorrhage is noted.   Bony structures appear within normal limits and the mastoid air   cells and visualized paranasal sinuses are normally aerated. Left   ear cochlear implant.      Impression:     1. Normal brain for age. No acute changes. No change since prior   exam.     Electronically signed by:  Uriel Hartmann MD  9/24/2020 9:35 AM CDT   Workstation: [id number]   CT Head Without Contrast [[id number]] Eran as  "Reviewed   Order Status: Completed Collected: 09/23/20 1502    Updated: 09/23/20 1531   Narrative:     EXAMINATION:  CT SCAN OF THE HEAD WITHOUT INTRAVENOUS CONTRAST     CLINICAL INFORMATION:  left headache, left side \"weakness\"     This exam was performed using radiation doses that are as low as   reasonably achievable (ALARA).   This exam was performed according to our departmental dose   optimization program, which includes automated exposure control,   adjustment of the mA and/or KV according to patient size and/or   use of iterative reconstruction technique.     COMPARISON: None available.     TECHNIQUE:  Axial images from skull base to vertex.         FINDINGS:   There is a left sided subcutaneous scalp implant with a wire into   the left auditory canal resulting in streak artifact.   There is no evidence of intracranial hemorrhage, parenchymal   mass, midline shift, or focal mass effect.   There is no hydrocephalus or effacement of the basilar cisterns.     There is no extra-axial hemorrhage or collection identified.     The mastoid air cells and visualized paranasal sinuses appear   clear.         Impression:     No evidence of intracranial hemorrhage, mass effect or large   acute infarct.       Electronically signed by:  Tommy Lynne MD  9/23/2020 3:30 PM CDT   Workstation: [id number]   CT Angiogram Head [[id number]] Eran as Reviewed   Order Status: Completed Collected: 09/23/20 1502    Updated: 09/23/20 1608   Narrative:     EXAM: CT HEAD ANGIOGRAPHY WITHOUT THEN WITH IV CONTRAST, CT NECK   ANGIOGRAPHY WITHOUT THEN WITH IV CONTRAST     COMPARISONS: CT head dated same day     INDICATION: weakness, dizziness     TECHNIQUE: CT images were obtained of the cervical and   intracranial vasculature after the uneventful administration of   iodinated intravenous contrast in the arterial phase. Degree of   stenosis evaluated by NASCET criteria. Reformats to include 3D   MIP were provided.     FINDINGS: "   Vasculature:   Aortic arch: Three-vessel aortic arch.     Right:   Common carotid: Normal course without focal stenosis, dissection,   or aneurysmal dilatation. No significant carotid atherosclerosis.   Carotid bulb: No significant atherosclerosis.     ICA: The cervical, petrous, cavernous, and supraclinoid segments   of the internal carotid artery are without focal stenosis,   dissection, or aneurysmal dilatation.   ECA: Proximal portion of the ECA is within normal limits.     Vertebral artery: Normal course without focal stenosis,   dissection, or aneurysmal dilatation. Vertebrobasilar junction is   within normal limits.     Left:   Common carotid: Normal course without focal stenosis, dissection,   or aneurysmal dilatation. No significant carotid atherosclerosis.   Carotid bulb: No significant atherosclerosis.     ICA: The cervical, petrous, cavernous, and supraclinoid segments   of the internal carotid artery are without focal stenosis,   dissection, or aneurysmal dilatation.   ECA: Proximal portion of the ECA is within normal limits.     Vertebral artery: Normal course without focal stenosis,   dissection, or aneurysmal dilatation. Vertebrobasilar junction is   within normal limits.     [place]: Within normal limits. The ACAs, MCAs, and MCAs'   distal branches are within normal limits. The anterior   communicating artery is present. The left posterior communicating   arteries present. The right communicating artery is not   visualized and may be below threshold for CT technique versus   congenitally absent.     Basilar Artery and Posterior Circulation: Within normal limits.     Venous: The major cervical venous vasculature is unremarkable.   Dural venous sinuses are within normal limits.     Non-vasculature:   Soft tissues: There is a 1.1 x 0.8 cm left thyroid nodule.   Lymph nodes: No bulky cervical chain lymphadenopathy.     Musculoskeletal: No acute fracture or suspicious osseous lesion.   Left  "auditory stimulator battery pack is noted along the left   temporal lobe with leads leading into the left ear.     Lungs: Appearance of biapical emphysema.    Impression:     CT neck angiogram within normal limits. No aneurysm, dissection,   or focal stenosis.     Left thyroid nodule measuring 1.1 cm. Consider nonemergent   outpatient thyroid ultrasound for further evaluation.     Appearance of biapical emphysema.      Chief Complaint on Day of Discharge: None      Hospital Course:  The patient is a 44 y.o. female, right-handed, with a known diagnosis of migraines, Meniere's disease s/p cochlear implant who presented with sudden onset of dizziness, left temporal lobe headache, left-sided facial, arm and leg weakness and left arm numbness.  She also had blurry vision in the left eye.  She presented to the emergency room and [facility] where CT scan of the brain was negative for any acute stroke.  CT angiogram of the neck was also negative for any stroke.  Patient could not have an MRI due to cochlear implant. However she received Imitrex for headaches and was placed in observation overnight.  In the morning, her symptoms resolved completely and she felt up at baseline.  She was reviewed by neurologist and was told that her symptoms were secondary to a migraine.  She was started on aspirin 325 mg daily and 40 mg daily of Lipitor for stroke prevention.  Echocardiogram did not show any embolic source of stroke.  She had hypercoagulable studies sent which are pending at the time of discharge.    Condition on Discharge: Stable    Physical Exam on Discharge:  /66 (BP Location: Right arm, Patient Position: Sitting)   Pulse 78   Temp 98 °F (36.7 °C) (Oral)   Resp 20   Ht 156.2 cm (61.5\")   Wt 105 kg (231 lb 7.7 oz)   SpO2 98%   BMI 43.04 kg/m²      Physical Exam  Constitutional:       General: She is not in acute distress.     Appearance: Normal appearance. She is not ill-appearing or " diaphoretic.   HENT:      Head: Normocephalic and atraumatic.      Right Ear: External ear normal.      Left Ear: External ear normal.      Nose: No congestion or rhinorrhea.      Mouth/Throat:      Mouth: Mucous membranes are moist.      Pharynx: No oropharyngeal exudate or posterior oropharyngeal erythema.   Eyes:      General: No scleral icterus.     Extraocular Movements: Extraocular movements intact.      Conjunctiva/sclera: Conjunctivae normal.   Neck:      Musculoskeletal: Neck supple. No neck rigidity or muscular tenderness.   Cardiovascular:      Rate and Rhythm: Normal rate and regular rhythm.      Heart sounds: Normal heart sounds. No murmur.   Pulmonary:      Effort: Pulmonary effort is normal. No respiratory distress.      Breath sounds: Normal breath sounds. No wheezing, rhonchi or rales.   Abdominal:      General: Abdomen is flat. There is no distension.      Palpations: Abdomen is soft.      Tenderness: There is no abdominal tenderness. There is no guarding.   Musculoskeletal:         General: No swelling, tenderness or deformity.      Right lower leg: No edema.      Left lower leg: No edema.   Lymphadenopathy:      Cervical: No cervical adenopathy.   Skin:     General: Skin is warm and dry.   Neurological:      General: No focal deficit present.      Mental Status: She is alert and oriented to person, place, and time.      Cranial Nerves: No cranial nerve deficit.      Motor: No weakness.   Psychiatric:         Mood and Affect: Mood normal.         Behavior: Behavior normal.         Thought Content: Thought content normal.         Discharge Disposition:  Home or Self Care    Discharge Medications:     Discharge Medications      New Medications      Instructions Start Date   aspirin 325 MG tablet   325 mg, Oral, Daily, For stroke prevention   Start Date: September 25, 2020     atorvastatin 40 MG tablet  Commonly known as: LIPITOR   40 mg, Oral, Nightly         Continue These Medications       Instructions Start Date   clonazePAM 1 MG tablet  Commonly known as: KlonoPIN   1 mg, Oral      lisinopril 20 MG tablet  Commonly known as: PRINIVIL,ZESTRIL   20 mg, Oral, Daily      pantoprazole 40 MG EC tablet  Commonly known as: PROTONIX   40 mg, Oral      prazosin 1 MG capsule  Commonly known as: MINIPRESS   1 mg, Oral, Nightly      tiZANidine 4 MG tablet  Commonly known as: ZANAFLEX   4 mg, Oral      topiramate 100 MG tablet  Commonly known as: TOPAMAX   50 mg, Oral      traZODone 100 MG tablet  Commonly known as: DESYREL   100 mg, Oral      Vraylar 3 MG capsule capsule  Generic drug: Cariprazine HCl   3 mg, Oral, Daily             Discharge Diet:   Diet Instructions     Diet: Regular      Discharge Diet: Regular        Resume normal diet with modifications for heart healthy diet. Limit sodium and saturated fat intake.                  Activity at Discharge:   Activity Instructions     Activity as Tolerated      Additional Activity Instructions:     As tolerated.                  Discharge Care Plan/Instructions:   1. Follow-up with your primary care provider within 1 week of discharge. Your primary care provider should review your blood tests done in the hospital to see if you have a condition that predisposes you to blood clots   2.  Use aspirin and Lipitor as prescribed for stroke prevention    Follow-up Appointments:   No future appointments.    Test Results Pending at Discharge:   Pending Labs     Order Current Status    Antithrombin III In process    Factor 5 Leiden In process    Factor II, DNA Analysis In process    HIV 1 GenoSure PRIme(R) In process    Homocysteine In process    Lupus Anticoagulant Panel In process    Methylmalonic Acid, Serum In process    Protein C & S Antigens In process    Protein C Activity In process    RPR In process    Vitamin B12 In process          Sabino Rodriguez MD  09/24/20  19:45 CDT    Time: 33 minutes of time was spent evaluating patient and planning  discharge.      Part of this note may be an electronic transcription/translation of spoken language to printed text using the Dragon Dictation system.

## 2020-09-24 NOTE — PLAN OF CARE
Goal Outcome Evaluation:  Plan of Care Reviewed With: patient  Progress: improving    Problem: Adult Inpatient Plan of Care  Goal: Plan of Care Review  Outcome: Ongoing, Progressing  Flowsheets (Taken 9/24/2020 0514)  Progress: improving  Plan of Care Reviewed With: patient  Outcome Summary: Pt alert and oriented x 4, pt NIH score of 0, pt NSR on tele monitor and blood pressure within normal limits. pt given prn pain medication for migraine headache with reported relief. Will continue to monitor pt.

## 2020-09-24 NOTE — PLAN OF CARE
Problem: Adult Inpatient Plan of Care  Goal: Plan of Care Review  Outcome: Ongoing, Progressing  Flowsheets  Taken 9/24/2020 1032  Plan of Care Reviewed With: patient  Outcome Summary:  Initial PT evaluation complete; co-evaluation with OT.  Patient is alert and very cooperative.  She demonstrates (I) with bed mobility, transfers and gait, ambulating 150' x 1 without an AD, no LOB, slow latrell.  Tinetti assessed: 27/28 or low fall risk, AD not necessary. Modified Tamia, pre: 0, post: 0.  Patient is at PLOF, no abnormal tone noted, RLE slightly weaker d/t chronic hip DJD, no coordination issues.   Skilled PT discharged.  Taken 9/24/2020 0957  Plan of Care Reviewed With: patient

## 2020-09-24 NOTE — THERAPY DISCHARGE NOTE
Acute Care - IRF Speech Language Pathology /Discharge  [facility]     Patient Name: Cherri Boyer  : 1975  MRN: [id number]  Today's Date: 2020         Admit Date: 2020  speech evaluation completed. pt scored 26/30.  she does not present with any lingering affects from left side numbness this date.  she is appropriate and likely at her baseline for cognitive linguistic skills.  no treatment recommended at this time  Visit Dx:     ICD-10-CM ICD-9-CM   1. Acute nonintractable headache, unspecified headache type  R51 784.0   2. Weakness  R53.1 780.79     Patient Active Problem List   Diagnosis   • Acute nonintractable headache              EDUCATION  The patient has been educated in the following areas:   Cognitive Impairment Communication Impairment Dysphagia (Swallowing Impairment).    SLP Recommendation and Plan  SLP Diagnosis: none        Anticipated Discharge Disposition (SLP): home  SLC Criteria for Skilled Therapy Interventions Met: not appropriate             Plan of Care Reviewed With: patient  Plan of Care Reviewed With: patient  Progress: improving            Time Calculation:   Time Calculation- SLP     Row Name 20 0822             Time Calculation- SLP    SLP Start Time  0800  -EC      SLP Stop Time  0823  -EC      SLP Time Calculation (min)  23 min  -EC      Total Timed Code Minutes- SLP  23 minute(s)  -EC      SLP Received On  20  -EC        User Key  (r) = Recorded By, (t) = Taken By, (c) = Cosigned By    Initials Name Provider Type    EC Leti Flowers CCC-SLP Speech and Language Pathologist          Therapy Charges for Today     Code Description Service Date Service Provider Modifiers Qty    [id number] HC ST EVAL SPEECH AND PROD W LANG  2 2020 Leti Flowers CCC-SLP GN 1               SLP Discharge Summary  Anticipated Discharge Disposition (SLP): home    RUTH ANN Bourgeois  2020

## 2020-09-25 LAB
F5 GENE MUT ANL BLD/T: NORMAL
FACTOR II, DNA ANALYSIS: NORMAL

## 2020-09-26 LAB — AT III ACT/NOR PPP CHRO: 130 % (ref 75–135)

## 2020-09-28 LAB
PROT C AG ACT/NOR PPP IA: 111 % (ref 60–150)
PROT S AG ACT/NOR PPP IA: 109 % (ref 60–150)
PROT S FREE AG ACT/NOR PPP IA: 123 % (ref 57–157)

## 2020-09-29 LAB
Lab: NORMAL
METHYLMALONATE SERPL-SCNC: 240 NMOL/L (ref 0–378)
PROT C ACT/NOR PPP CHRO: 132 %

## 2020-10-01 LAB
APTT HEX PL PPP: 5 SEC
APTT IMM NP PPP: NORMAL S
APTT PPP 1:1 SALINE: NORMAL S
APTT PPP: 26.4 SEC
B2 GLYCOPROT1 IGA SER-ACNC: <10 SAU
B2 GLYCOPROT1 IGG SER-ACNC: <10 SGU
B2 GLYCOPROT1 IGM SER-ACNC: <10 SMU
CARDIOLIPIN IGG SER IA-ACNC: <10 GPL
CARDIOLIPIN IGM SER IA-ACNC: 13 MPL
CONFIRM APTT: 0 SEC
CONFIRM DRVVT: NORMAL S
DRVVT SCREEN TO CONFIRM RATIO: NORMAL {RATIO}
INR PPP: 1 RATIO
LABORATORY COMMENT REPORT: NORMAL
PROTHROMBIN TIME: 10.4 SEC
SCREEN DRVVT: 46 SEC
THROMBIN TIME: 17.7 SEC

## 2020-10-03 LAB
GENE ANALYSIS NARR RPT DOC: NORMAL
HIV 1 RNA RT + PR + IN MUT DET PLAS SEQ: NORMAL
HIV1 RNA # SERPL NAA+PROBE: <200 COPIES/ML
HIV1 RNA SERPL NAA+PROBE-LOG#: NORMAL {LOG_COPIES}/ML
SPECIMEN CONDITION: NORMAL

## 2020-10-12 DIAGNOSIS — M25.552 LEFT HIP PAIN: Primary | ICD-10-CM

## 2020-10-14 ENCOUNTER — OFFICE VISIT (OUTPATIENT)
Dept: ORTHOPEDIC SURGERY | Facility: CLINIC | Age: 45
End: 2020-10-14

## 2020-10-14 VITALS — WEIGHT: 234.8 LBS | HEIGHT: 62 IN | BODY MASS INDEX: 43.21 KG/M2

## 2020-10-14 DIAGNOSIS — M25.551 RIGHT HIP PAIN: Primary | ICD-10-CM

## 2020-10-14 PROCEDURE — 99203 OFFICE O/P NEW LOW 30 MIN: CPT | Performed by: ORTHOPAEDIC SURGERY

## 2020-10-14 RX ORDER — TRIAMTERENE AND HYDROCHLOROTHIAZIDE 37.5; 25 MG/1; MG/1
1 CAPSULE ORAL EVERY MORNING
COMMUNITY

## 2020-10-14 NOTE — PROGRESS NOTES
Cherri Boyer is a 44 y.o. female   Primary provider:  Naomi Doan MD       Chief Complaint   Patient presents with   • Right Hip - Pain   • Left Hip - Pain       HISTORY OF PRESENT ILLNESS:Patient is here today for bilateral hip pain. Patient states that her pain is 5/10.  Really the right hip bothers her.  She is not really complain of the left very much.  She is had no injury is been going for 2 years she has been treated with anti-inflammatories with no relief and also activity modification.  Allergy to prednisone.  She does not work outside the home she is disabled with Ménière's disease which is fairly severe.  She has a cochlear implant.    Pain  This is a chronic problem. The current episode started more than 1 year ago. The problem occurs intermittently. The problem has been gradually worsening. Associated symptoms include arthralgias, chest pain, headaches and myalgias. Pertinent negatives include no abdominal pain, chills, fever, nausea or vomiting. The symptoms are aggravated by walking and standing (Sitting).        CONCURRENT MEDICAL HISTORY:    No past medical history on file.    Allergies   Allergen Reactions   • Prednisone Itching         Current Outpatient Medications:   •  Cariprazine HCl (Vraylar) 3 MG capsule capsule, Take 3 mg by mouth Daily., Disp: , Rfl:   •  clonazePAM (KlonoPIN) 1 MG tablet, Take 1 mg by mouth., Disp: , Rfl:   •  lisinopril (PRINIVIL,ZESTRIL) 20 MG tablet, Take 20 mg by mouth Daily., Disp: , Rfl:   •  pantoprazole (PROTONIX) 40 MG EC tablet, Take 40 mg by mouth., Disp: , Rfl:   •  prazosin (MINIPRESS) 1 MG capsule, Take 1 mg by mouth Every Night., Disp: , Rfl:   •  tiZANidine (ZANAFLEX) 4 MG tablet, Take 4 mg by mouth., Disp: , Rfl:   •  topiramate (TOPAMAX) 100 MG tablet, Take 50 mg by mouth., Disp: , Rfl:   •  traZODone (DESYREL) 100 MG tablet, Take 100 mg by mouth., Disp: , Rfl:   •  triamterene-hydrochlorothiazide (DYAZIDE) 37.5-25 MG per capsule, Take 1  "capsule by mouth Every Morning., Disp: , Rfl:     No past surgical history on file.    No family history on file.    Social History     Socioeconomic History   • Marital status:      Spouse name: Not on file   • Number of children: Not on file   • Years of education: Not on file   • Highest education level: Not on file   Tobacco Use   • Smoking status: Never Smoker        Review of Systems   Constitutional: Positive for activity change and unexpected weight change. Negative for chills and fever.   HENT: Positive for hearing loss. Negative for facial swelling.    Eyes: Negative.    Respiratory: Negative.  Negative for apnea and shortness of breath.    Cardiovascular: Positive for chest pain and palpitations. Negative for leg swelling.   Gastrointestinal: Negative.  Negative for abdominal pain, nausea and vomiting.   Endocrine: Negative.    Genitourinary: Negative.  Negative for dysuria.   Musculoskeletal: Positive for arthralgias and myalgias.   Skin: Negative.  Negative for color change.   Allergic/Immunologic: Negative.    Neurological: Positive for dizziness and headaches. Negative for seizures and syncope.   Hematological: Negative.  Negative for adenopathy.   Psychiatric/Behavioral: Positive for sleep disturbance. Negative for dysphoric mood.        Anxiety/Depression         PHYSICAL EXAMINATION:       Ht 156.2 cm (61.5\")   Wt 107 kg (234 lb 12.8 oz)   BMI 43.65 kg/m²     Physical Exam  Constitutional:       Appearance: She is well-developed.   HENT:      Head: Normocephalic and atraumatic.   Eyes:      Pupils: Pupils are equal, round, and reactive to light.   Neck:      Musculoskeletal: Neck supple.   Pulmonary:      Effort: Pulmonary effort is normal.   Musculoskeletal: Normal range of motion.         General: Tenderness present. No deformity.   Skin:     General: Skin is warm and dry.   Neurological:      Mental Status: She is alert and oriented to person, place, and time.         GAIT:     []  " Normal  []  Antalgic    Assistive device: [x]  None  []  Walker     []  Crutches  []  Cane     []  Wheelchair  []  Stretcher    Ortho Exam  No real pain today with internal/external rotation of the hip.  Mild tenderness laterally.  Grossly neurologically intact.  Some pain resisted hip flexion good pain good motion of abduction and adduction.  Mild tenderness the lower lumbosacral spine over the sciatic notch on the right side.      Ct Angiogram Head    Result Date: 9/23/2020  Narrative: EXAM: CT HEAD ANGIOGRAPHY WITHOUT THEN WITH IV CONTRAST, CT NECK ANGIOGRAPHY WITHOUT THEN WITH IV CONTRAST COMPARISONS: CT head dated same day INDICATION: weakness, dizziness TECHNIQUE: CT images were obtained of the cervical and intracranial vasculature after the uneventful administration of iodinated intravenous contrast in the arterial phase. Degree of stenosis evaluated by NASCET criteria. Reformats to include 3D MIP were provided. FINDINGS: Vasculature: Aortic arch: Three-vessel aortic arch. Right: Common carotid: Normal course without focal stenosis, dissection, or aneurysmal dilatation. No significant carotid atherosclerosis. Carotid bulb: No significant atherosclerosis. ICA: The cervical, petrous, cavernous, and supraclinoid segments of the internal carotid artery are without focal stenosis, dissection, or aneurysmal dilatation. ECA: Proximal portion of the ECA is within normal limits. Vertebral artery: Normal course without focal stenosis, dissection, or aneurysmal dilatation. Vertebrobasilar junction is within normal limits. Left: Common carotid: Normal course without focal stenosis, dissection, or aneurysmal dilatation. No significant carotid atherosclerosis. Carotid bulb: No significant atherosclerosis. ICA: The cervical, petrous, cavernous, and supraclinoid segments of the internal carotid artery are without focal stenosis, dissection, or aneurysmal dilatation. ECA: Proximal portion of the ECA is within normal limits.  Vertebral artery: Normal course without focal stenosis, dissection, or aneurysmal dilatation. Vertebrobasilar junction is within normal limits. [place]: Within normal limits. The ACAs, MCAs, and MCAs' distal branches are within normal limits. The anterior communicating artery is present. The left posterior communicating arteries present. The right communicating artery is not visualized and may be below threshold for CT technique versus congenitally absent. Basilar Artery and Posterior Circulation: Within normal limits. Venous: The major cervical venous vasculature is unremarkable. Dural venous sinuses are within normal limits. Non-vasculature: Soft tissues: There is a 1.1 x 0.8 cm left thyroid nodule. Lymph nodes: No bulky cervical chain lymphadenopathy. Musculoskeletal: No acute fracture or suspicious osseous lesion. Left auditory stimulator battery pack is noted along the left temporal lobe with leads leading into the left ear. Lungs: Appearance of biapical emphysema.     Impression: CT neck angiogram within normal limits. No aneurysm, dissection, or focal stenosis. Left thyroid nodule measuring 1.1 cm. Consider nonemergent outpatient thyroid ultrasound for further evaluation. Appearance of biapical emphysema. Electronically signed by:  Christophe Owens MD  9/23/2020 4:07 PM CDT Workstation: [id number]    Ct Head Without Contrast    Result Date: 9/24/2020  Narrative: Noncontrast CT examination of the brain. INDICATION: Stroke protocol, weakness.   Technique: Axial 5 mm contiguous images with brain parenchymal and bone windows This exam was performed according to our departmental dose-optimization program, which includes automated exposure control, adjustment of the mA and/or kV according to patient size and/or use of iterative reconstruction technique. Prior relevant examination: CT brain September 23, 2020. Brain parenchyma appears within normal limits. Ventricles are within normal limits in size.  "No evidence of abnormal mass or calcification is seen. No evidence of acute hemorrhage is noted. Bony structures appear within normal limits and the mastoid air cells and visualized paranasal sinuses are normally aerated. Left ear cochlear implant.     Impression: 1. Normal brain for age. No acute changes. No change since prior exam. Electronically signed by:  Uriel Hartmann MD  9/24/2020 9:35 AM CDT Workstation: [id number]    Ct Head Without Contrast    Result Date: 9/23/2020  Narrative: EXAMINATION:  CT SCAN OF THE HEAD WITHOUT INTRAVENOUS CONTRAST CLINICAL INFORMATION:  left headache, left side \"weakness\" This exam was performed using radiation doses that are as low as reasonably achievable (ALARA). This exam was performed according to our departmental dose optimization program, which includes automated exposure control, adjustment of the mA and/or KV according to patient size and/or use of iterative reconstruction technique. COMPARISON: None available. TECHNIQUE:  Axial images from skull base to vertex.  FINDINGS: There is a left sided subcutaneous scalp implant with a wire into the left auditory canal resulting in streak artifact. There is no evidence of intracranial hemorrhage, parenchymal mass, midline shift, or focal mass effect. There is no hydrocephalus or effacement of the basilar cisterns. There is no extra-axial hemorrhage or collection identified. The mastoid air cells and visualized paranasal sinuses appear clear.       Impression: No evidence of intracranial hemorrhage, mass effect or large acute infarct. Electronically signed by:  Tommy Lynne MD  9/23/2020 3:30 PM CDT Workstation: [id number]    Ct Angiogram Neck    Result Date: 9/23/2020  Narrative: EXAM: CT HEAD ANGIOGRAPHY WITHOUT THEN WITH IV CONTRAST, CT NECK ANGIOGRAPHY WITHOUT THEN WITH IV CONTRAST COMPARISONS: CT head dated same day INDICATION: weakness, dizziness TECHNIQUE: CT images were obtained of the cervical and intracranial " vasculature after the uneventful administration of iodinated intravenous contrast in the arterial phase. Degree of stenosis evaluated by NASCET criteria. Reformats to include 3D MIP were provided. FINDINGS: Vasculature: Aortic arch: Three-vessel aortic arch. Right: Common carotid: Normal course without focal stenosis, dissection, or aneurysmal dilatation. No significant carotid atherosclerosis. Carotid bulb: No significant atherosclerosis. ICA: The cervical, petrous, cavernous, and supraclinoid segments of the internal carotid artery are without focal stenosis, dissection, or aneurysmal dilatation. ECA: Proximal portion of the ECA is within normal limits. Vertebral artery: Normal course without focal stenosis, dissection, or aneurysmal dilatation. Vertebrobasilar junction is within normal limits. Left: Common carotid: Normal course without focal stenosis, dissection, or aneurysmal dilatation. No significant carotid atherosclerosis. Carotid bulb: No significant atherosclerosis. ICA: The cervical, petrous, cavernous, and supraclinoid segments of the internal carotid artery are without focal stenosis, dissection, or aneurysmal dilatation. ECA: Proximal portion of the ECA is within normal limits. Vertebral artery: Normal course without focal stenosis, dissection, or aneurysmal dilatation. Vertebrobasilar junction is within normal limits. [place]: Within normal limits. The ACAs, MCAs, and MCAs' distal branches are within normal limits. The anterior communicating artery is present. The left posterior communicating arteries present. The right communicating artery is not visualized and may be below threshold for CT technique versus congenitally absent. Basilar Artery and Posterior Circulation: Within normal limits. Venous: The major cervical venous vasculature is unremarkable. Dural venous sinuses are within normal limits. Non-vasculature: Soft tissues: There is a 1.1 x 0.8 cm left thyroid nodule. Lymph nodes: No  bulky cervical chain lymphadenopathy. Musculoskeletal: No acute fracture or suspicious osseous lesion. Left auditory stimulator battery pack is noted along the left temporal lobe with leads leading into the left ear. Lungs: Appearance of biapical emphysema.     Impression: CT neck angiogram within normal limits. No aneurysm, dissection, or focal stenosis. Left thyroid nodule measuring 1.1 cm. Consider nonemergent outpatient thyroid ultrasound for further evaluation. Appearance of biapical emphysema. Electronically signed by:  Christophe Owens MD  9/23/2020 4:07 PM CDT Workstation: [id number]    Xr Chest 1 View    Result Date: 9/23/2020  Narrative: PROCEDURE: XR CHEST 1 VW VIEWS:Single INDICATION: Weakness COMPARISON: None FINDINGS:   - lines/tubes: None   - cardiac: Size within normal limits.   - mediastinum: Contour within normal limits.   - lungs: Mild streaky opacity right lung base may represent atelectasis or infiltrate.   - pleura: No evidence of  fluid.    - osseous: Unremarkable for age.     Impression: Mild streaky right base atelectasis versus infiltrate Electronically signed by:  Yuridia Guillen MD  9/23/2020 3:07 PM CDT Workstation: [id number]    I reviewed x-rays of her hip joint spaces well-preserved no acute findings noted no significant deformity is seen of the hip.      ASSESSMENT:    Diagnoses and all orders for this visit:    Right hip pain  -     NM Bone Scan Whole Body; Future    Other orders  -     triamterene-hydrochlorothiazide (DYAZIDE) 37.5-25 MG per capsule; Take 1 capsule by mouth Every Morning.          PLAN I think at this point the concern is a relatively young patient with no injury normal looking x-ray her pain is in her groin and hurts with activity she has difficult time getting in and out of her car and it sounds like intra-articular pathology.  I think at this point she cannot have an MRI scan which we my first choice.  Second choice would be a bone scan to look for activity  in the hip joint to rule out things like avascular necrosis or something going on here.  If bone scan is negative we may look up further in her back.  We will see her back after bone scan.  This below 2 years and increasing in severity.  Think we need objective information  Patient's Body mass index is 43.65 kg/m². BMI is above normal parameters. Recommendations include: exercise counseling and nutrition counseling.  No follow-ups on file.      This document has been electronically signed by Cristopher Richardson MD on October 14, 2020 09:34 CDT

## 2020-10-30 ENCOUNTER — APPOINTMENT (OUTPATIENT)
Dept: NUCLEAR MEDICINE | Facility: HOSPITAL | Age: 45
End: 2020-10-30

## 2020-11-05 ENCOUNTER — HOSPITAL ENCOUNTER (OUTPATIENT)
Dept: NUCLEAR MEDICINE | Facility: HOSPITAL | Age: 45
Discharge: HOME | End: 2020-11-05

## 2020-11-05 DIAGNOSIS — M25.551 RIGHT HIP PAIN: ICD-10-CM

## 2020-11-05 PROCEDURE — 0 TECHNETIUM MEDRONATE KIT: Performed by: ORTHOPAEDIC SURGERY

## 2020-11-05 PROCEDURE — 78306 BONE IMAGING WHOLE BODY: CPT

## 2020-11-05 PROCEDURE — A9503 TC99M MEDRONATE: HCPCS | Performed by: ORTHOPAEDIC SURGERY

## 2020-11-05 RX ORDER — TC 99M MEDRONATE 20 MG/10ML
27.5 INJECTION, POWDER, LYOPHILIZED, FOR SOLUTION INTRAVENOUS
Status: COMPLETED | OUTPATIENT
Start: 2020-11-05 | End: 2020-11-05

## 2020-11-05 RX ADMIN — Medication 27.5 MILLICURIE: at 09:23

## 2020-11-16 ENCOUNTER — OFFICE VISIT (OUTPATIENT)
Dept: ORTHOPEDIC SURGERY | Facility: CLINIC | Age: 45
End: 2020-11-16

## 2020-11-16 DIAGNOSIS — M54.30 SCIATICA, UNSPECIFIED LATERALITY: ICD-10-CM

## 2020-11-16 DIAGNOSIS — M25.551 RIGHT HIP PAIN: Primary | ICD-10-CM

## 2020-11-16 PROCEDURE — 99442 PR PHYS/QHP TELEPHONE EVALUATION 11-20 MIN: CPT | Performed by: ORTHOPAEDIC SURGERY

## 2020-11-16 NOTE — PROGRESS NOTES
Subjective   Cherri Boyer is a 45 y.o. female.     This is a telephonic visit because of the Covid situation.  We obtained appropriate consent for this visit.  She is here to follow-up bone scan results for persistent pelvis and right hip pain.  Her situation is about the same as it was previously       The following portions of the patient's history were reviewed and updated as appropriate: allergies, current medications, past family history, past medical history, past social history, past surgical history and problem list.    Review of Systems   Constitutional: Positive for activity change.   Musculoskeletal: Positive for arthralgias, back pain and gait problem.   All other systems reviewed and are negative.      Objective   Physical Exam  Constitutional:       General: She is not in acute distress.  Pulmonary:      Effort: Pulmonary effort is normal.   Neurological:      Mental Status: She is alert and oriented to person, place, and time.   Psychiatric:         Mood and Affect: Mood normal.         Behavior: Behavior normal.           Assessment/Plan   Problems Addressed this Visit        Nervous and Auditory    Right hip pain - Primary    Relevant Orders    CT Pelvis Without Contrast    Sciatica    Relevant Orders    CT Lumbar Spine Without Contrast    CT Pelvis Without Contrast      Diagnoses       Codes Comments    Right hip pain    -  Primary ICD-10-CM: M25.551  ICD-9-CM: 719.45     Sciatica, unspecified laterality     ICD-10-CM: M54.30  ICD-9-CM: 724.3           Plan: At this point we got her bone scan it really does not show anything specific.  She has persistent problem.  Unfortunately she will have MRI scan again work-up her back I think this will get a scan of her pelvis to try to get this this is a bothersome problem.  Once assuming these 2 things are negative may consider an intra-articular injection in the right hip to see if this helps her from a therapeutic and also diagnostic standpoint.   Discussed this further after the studies.  We spent 10 to 15 minutes discussing this on the phone.

## 2020-11-24 ENCOUNTER — CONSULT (OUTPATIENT)
Dept: SURGERY | Facility: CLINIC | Age: 45
End: 2020-11-24

## 2020-11-24 ENCOUNTER — LAB (OUTPATIENT)
Dept: LAB | Facility: HOSPITAL | Age: 45
End: 2020-11-24

## 2020-11-24 VITALS
HEIGHT: 62 IN | DIASTOLIC BLOOD PRESSURE: 72 MMHG | WEIGHT: 236 LBS | TEMPERATURE: 97.2 F | HEART RATE: 83 BPM | BODY MASS INDEX: 43.43 KG/M2 | SYSTOLIC BLOOD PRESSURE: 130 MMHG

## 2020-11-24 DIAGNOSIS — E04.1 THYROID NODULE: Primary | ICD-10-CM

## 2020-11-24 DIAGNOSIS — E04.1 THYROID NODULE: ICD-10-CM

## 2020-11-24 LAB
T4 FREE SERPL-MCNC: 1.4 NG/DL (ref 0.93–1.7)
TSH SERPL DL<=0.05 MIU/L-ACNC: 1.67 UIU/ML (ref 0.27–4.2)

## 2020-11-24 PROCEDURE — 36415 COLL VENOUS BLD VENIPUNCTURE: CPT

## 2020-11-24 PROCEDURE — 84443 ASSAY THYROID STIM HORMONE: CPT

## 2020-11-24 PROCEDURE — 99204 OFFICE O/P NEW MOD 45 MIN: CPT | Performed by: SURGERY

## 2020-11-24 PROCEDURE — 84439 ASSAY OF FREE THYROXINE: CPT

## 2020-11-24 RX ORDER — OMEPRAZOLE 20 MG/1
40 CAPSULE, DELAYED RELEASE ORAL AS NEEDED
COMMUNITY

## 2020-11-24 RX ORDER — ALBUTEROL SULFATE 90 UG/1
AEROSOL, METERED RESPIRATORY (INHALATION) AS NEEDED
COMMUNITY

## 2020-11-24 RX ORDER — INHALER, ASSIST DEVICES
SPACER (EA) MISCELLANEOUS AS NEEDED
COMMUNITY

## 2020-11-24 RX ORDER — TOPIRAMATE 50 MG/1
TABLET, FILM COATED ORAL DAILY
COMMUNITY
Start: 2020-08-31

## 2020-11-24 RX ORDER — MECLIZINE HYDROCHLORIDE 25 MG/1
TABLET ORAL AS NEEDED
COMMUNITY

## 2020-11-24 RX ORDER — SUMATRIPTAN 50 MG/1
TABLET, FILM COATED ORAL AS NEEDED
COMMUNITY

## 2020-11-24 RX ORDER — LAMOTRIGINE 25 MG/1
TABLET, EXTENDED RELEASE ORAL DAILY
COMMUNITY
Start: 2020-10-30

## 2020-11-24 NOTE — PROGRESS NOTES
CHIEF COMPLAINT:    Incidentally noted thyroid nodule had a recent echocardiogram in which a thyroid nodule was incidentally noted.  She then underwent formal    HISTORY OF PRESENT ILLNESS:    Cherri Boyer is a 45 y.o. female who thyroid ultrasound showing a 2 cm complex lesion within the left thyroid as well as a 0.5 cm cystic nodule in the right thyroid gland per outside ultrasound report.  The patient does note some dysphagia, occasional hoarseness, recent weight gain as well as cold intolerance.  She has no thyroid function tests which I have access to.  She brought a disc with her ultrasound images but I am unable to access these images.    History reviewed. No pertinent past medical history.    History reviewed. No pertinent surgical history.    Prior to Admission medications    Medication Sig Start Date End Date Taking? Authorizing Provider   Cariprazine HCl (Vraylar) 3 MG capsule capsule Take 3 mg by mouth Daily.   Yes Brandon Bland MD   Cariprazine HCl (Vraylar) 3 MG capsule capsule Daily.   Yes Brandon Bland MD   lamoTRIgine ER 25 MG tablet sustained-release 24 hour Daily. 10/30/20  Yes Brandon Bland MD   lisinopril (PRINIVIL,ZESTRIL) 20 MG tablet Take 20 mg by mouth Daily.   Yes Brandon Bland MD   metoprolol tartrate (LOPRESSOR) 25 MG tablet Take 1 tablet by mouth Every 12 (Twelve) Hours.   Yes Brandon Bland MD   prazosin (MINIPRESS) 1 MG capsule Take 1 mg by mouth Every Night.   Yes Brandon Bland MD   tiZANidine (ZANAFLEX) 4 MG tablet Take 4 mg by mouth.   Yes Brandon Bland MD   topiramate (TOPAMAX) 50 MG tablet Daily. 8/31/20  Yes Brandon Bland MD   triamterene-hydrochlorothiazide (DYAZIDE) 37.5-25 MG per capsule Take 1 capsule by mouth Every Morning.   Yes Brandon Bland MD   albuterol sulfate  (90 Base) MCG/ACT inhaler As Needed.    Brandon Bland MD   clonazePAM (KlonoPIN) 1 MG tablet Take 1 mg by mouth.     Brandon Bland MD   meclizine (ANTIVERT) 25 MG tablet As Needed.    Brandon Bland MD   omeprazole (priLOSEC) 20 MG capsule Take 40 mg by mouth As Needed.    Brandon Bland MD   pantoprazole (PROTONIX) 40 MG EC tablet Take 40 mg by mouth.    Brandon Bland MD   Spacer/Aero-Holding Chambers (OptiChamber Agueda) misc As Needed.    Brandon Bland MD   SUMAtriptan (Imitrex) 50 MG tablet As Needed.    Brandon Bland MD   topiramate (TOPAMAX) 100 MG tablet Take 50 mg by mouth.    Brandon Bland MD   traZODone (DESYREL) 100 MG tablet Take 100 mg by mouth.    Brandon Bland MD       Allergies   Allergen Reactions   • Prednisone Itching       History reviewed. No pertinent family history.    Social History     Socioeconomic History   • Marital status:      Spouse name: Not on file   • Number of children: Not on file   • Years of education: Not on file   • Highest education level: Not on file   Tobacco Use   • Smoking status: Never Smoker   • Smokeless tobacco: Never Used   Substance and Sexual Activity   • Alcohol use: Not Currently       Review of Systems   Constitutional: Negative for appetite change, chills, fever and unexpected weight change.   HENT: Positive for trouble swallowing. Negative for hearing loss and nosebleeds.    Eyes: Negative for visual disturbance.   Respiratory: Negative for apnea, cough, choking, chest tightness, shortness of breath, wheezing and stridor.    Cardiovascular: Positive for chest pain and palpitations. Negative for leg swelling.   Gastrointestinal: Positive for nausea. Negative for abdominal distention, abdominal pain, blood in stool, constipation, diarrhea and vomiting.   Endocrine: Negative for cold intolerance, heat intolerance, polydipsia, polyphagia and polyuria.   Genitourinary: Negative for difficulty urinating, dysuria, frequency, hematuria and urgency.   Musculoskeletal: Positive for arthralgias and back pain.  "Negative for myalgias and neck pain.   Skin: Negative for color change, pallor and rash.   Allergic/Immunologic: Negative for immunocompromised state.   Neurological: Positive for dizziness and headaches. Negative for seizures, syncope, light-headedness and numbness.   Hematological: Negative for adenopathy.   Psychiatric/Behavioral: Negative for suicidal ideas. The patient is not nervous/anxious.        Objective     /72   Pulse 83   Temp 97.2 °F (36.2 °C) (Temporal)   Ht 156.2 cm (61.5\")   Wt 107 kg (236 lb)   BMI 43.87 kg/m²     Physical Exam  Vitals signs reviewed.   Constitutional:       General: She is not in acute distress.     Appearance: Normal appearance. She is obese. She is not ill-appearing, toxic-appearing or diaphoretic.   HENT:      Head: Normocephalic and atraumatic.   Eyes:      General: No scleral icterus.        Right eye: No discharge.         Left eye: No discharge.      Extraocular Movements: Extraocular movements intact.      Conjunctiva/sclera: Conjunctivae normal.   Neck:      Musculoskeletal: Normal range of motion. No neck rigidity or muscular tenderness.      Thyroid: No thyroid mass, thyromegaly or thyroid tenderness.   Cardiovascular:      Rate and Rhythm: Normal rate and regular rhythm.   Pulmonary:      Effort: Pulmonary effort is normal. No respiratory distress.   Lymphadenopathy:      Cervical: No cervical adenopathy.   Skin:     General: Skin is warm.   Neurological:      General: No focal deficit present.      Mental Status: She is alert and oriented to person, place, and time.   Psychiatric:         Mood and Affect: Mood normal.         Behavior: Behavior normal.         Thought Content: Thought content normal.         Judgment: Judgment normal.         DIAGNOSTIC DATA:    Ultrasound report as reviewed in HPI shows a complex lesion in the left thyroid which measures 2 cm, small cystic lesion in the right thyroid measuring 0.5 cm    Repeat ultrasound and thyroid tests " were ordered today    ASSESSMENT:    Thyroid nodule    PLAN:    I am unable to review the patient's imaging and she has a fairly vague report from the outside radiologist.  He did recommend that she have serial imaging.  We will repeat an ultrasound and get thyroid function testing.  She will see us back after this is been completed.          This document has been electronically signed by Stone Busby MD on November 24, 2020 12:20 CST

## 2020-11-30 ENCOUNTER — APPOINTMENT (OUTPATIENT)
Dept: ULTRASOUND IMAGING | Facility: HOSPITAL | Age: 45
End: 2020-11-30

## 2020-12-02 ENCOUNTER — HOSPITAL ENCOUNTER (OUTPATIENT)
Dept: ULTRASOUND IMAGING | Facility: HOSPITAL | Age: 45
Discharge: HOME | End: 2020-12-02

## 2020-12-02 ENCOUNTER — HOSPITAL ENCOUNTER (OUTPATIENT)
Dept: CT IMAGING | Facility: HOSPITAL | Age: 45
Discharge: HOME | End: 2020-12-02

## 2020-12-02 DIAGNOSIS — M54.30 SCIATICA, UNSPECIFIED LATERALITY: ICD-10-CM

## 2020-12-02 DIAGNOSIS — M25.551 RIGHT HIP PAIN: ICD-10-CM

## 2020-12-02 DIAGNOSIS — E04.1 THYROID NODULE: ICD-10-CM

## 2020-12-02 PROCEDURE — 72192 CT PELVIS W/O DYE: CPT

## 2020-12-02 PROCEDURE — 72131 CT LUMBAR SPINE W/O DYE: CPT

## 2020-12-02 PROCEDURE — 76536 US EXAM OF HEAD AND NECK: CPT

## 2020-12-03 ENCOUNTER — TRANSCRIBE ORDERS (OUTPATIENT)
Dept: GENERAL RADIOLOGY | Facility: HOSPITAL | Age: 45
End: 2020-12-03

## 2020-12-03 ENCOUNTER — OFFICE VISIT (OUTPATIENT)
Dept: SURGERY | Facility: CLINIC | Age: 45
End: 2020-12-03

## 2020-12-03 VITALS
BODY MASS INDEX: 43.94 KG/M2 | TEMPERATURE: 98.4 F | HEIGHT: 62 IN | HEART RATE: 83 BPM | DIASTOLIC BLOOD PRESSURE: 80 MMHG | SYSTOLIC BLOOD PRESSURE: 132 MMHG | WEIGHT: 238.8 LBS

## 2020-12-03 DIAGNOSIS — E04.1 THYROID NODULE: ICD-10-CM

## 2020-12-03 DIAGNOSIS — E04.1 THYROID NODULE: Primary | ICD-10-CM

## 2020-12-03 DIAGNOSIS — R13.10 DYSPHAGIA, UNSPECIFIED TYPE: Primary | ICD-10-CM

## 2020-12-03 DIAGNOSIS — Z01.818 PRE-OP TESTING: Primary | ICD-10-CM

## 2020-12-03 DIAGNOSIS — R13.19 ESOPHAGEAL DYSPHAGIA: ICD-10-CM

## 2020-12-03 PROCEDURE — 99024 POSTOP FOLLOW-UP VISIT: CPT | Performed by: SURGERY

## 2020-12-03 NOTE — PROGRESS NOTES
CHIEF COMPLAINT:    Chief Complaint   Patient presents with   • Follow-up     Recheck thyroid nodule- U/S results       HISTORY OF PRESENT ILLNESS:    Cherri Marsh is a 45 y.o. female who underwent repeat thyroid ultrasound as well as thyroid functional testing due to recent incidentally noted left thyroid nodule.  Her ultrasound showed a benign appearing cystic and solid lesion in the left lobe, radiology recommended a repeat ultrasound in 1 year.  Her TSH and T4 levels were normal.  This was discussed with her today.    Patient notes that she does have intermittent dysphagia and is concerned about this.    EXAM:  Vitals:    12/03/20 1041   BP: 132/80   Pulse: 83   Temp: 98.4 °F (36.9 °C)         No acute distress    ASSESSMENT:    Fairly small benign-appearing thyroid nodule, dysphagia    PLAN:    We will plan for repeat ultrasound of the thyroid in 1 year to assess stability.    She has dysphagia which does not appear to be related to the thyroid nodule.  She states this occurs intermittently and is somewhat dependent on what she is eating.  She has never had an upper endoscopy.  I recommended that she undergo an upper GI to assess for anatomic abnormalities and pending the results of that study she may also need an upper endoscopy for further assessment and/or dilation.          This document has been electronically signed by Stone Busby MD on December 3, 2020 13:36 CST

## 2020-12-06 ENCOUNTER — HOSPITAL ENCOUNTER (EMERGENCY)
Facility: HOSPITAL | Age: 45
Discharge: HOME | End: 2020-12-06
Attending: STUDENT IN AN ORGANIZED HEALTH CARE EDUCATION/TRAINING PROGRAM | Admitting: STUDENT IN AN ORGANIZED HEALTH CARE EDUCATION/TRAINING PROGRAM

## 2020-12-06 ENCOUNTER — APPOINTMENT (OUTPATIENT)
Dept: GENERAL RADIOLOGY | Facility: HOSPITAL | Age: 45
End: 2020-12-06

## 2020-12-06 VITALS
DIASTOLIC BLOOD PRESSURE: 77 MMHG | WEIGHT: 234 LBS | SYSTOLIC BLOOD PRESSURE: 112 MMHG | TEMPERATURE: 98.7 F | OXYGEN SATURATION: 96 % | HEIGHT: 62 IN | RESPIRATION RATE: 20 BRPM | BODY MASS INDEX: 43.06 KG/M2 | HEART RATE: 82 BPM

## 2020-12-06 DIAGNOSIS — R06.02 SOB (SHORTNESS OF BREATH): ICD-10-CM

## 2020-12-06 DIAGNOSIS — R07.9 CHEST PAIN, UNSPECIFIED TYPE: Primary | ICD-10-CM

## 2020-12-06 LAB
ALBUMIN SERPL-MCNC: 4.6 G/DL (ref 3.5–5.2)
ALBUMIN/GLOB SERPL: 1.6 G/DL
ALP SERPL-CCNC: 84 U/L (ref 39–117)
ALT SERPL W P-5'-P-CCNC: 13 U/L (ref 1–33)
ANION GAP SERPL CALCULATED.3IONS-SCNC: 13 MMOL/L (ref 5–15)
AST SERPL-CCNC: 12 U/L (ref 1–32)
BASOPHILS # BLD AUTO: 0.1 10*3/MM3 (ref 0–0.2)
BASOPHILS NFR BLD AUTO: 0.7 % (ref 0–1.5)
BILIRUB SERPL-MCNC: 0.3 MG/DL (ref 0–1.2)
BUN SERPL-MCNC: 18 MG/DL (ref 6–20)
BUN/CREAT SERPL: 15.5 (ref 7–25)
CALCIUM SPEC-SCNC: 10.2 MG/DL (ref 8.6–10.5)
CHLORIDE SERPL-SCNC: 101 MMOL/L (ref 98–107)
CO2 SERPL-SCNC: 21 MMOL/L (ref 22–29)
CREAT SERPL-MCNC: 1.16 MG/DL (ref 0.57–1)
DEPRECATED RDW RBC AUTO: 42.2 FL (ref 37–54)
EOSINOPHIL # BLD AUTO: 0.05 10*3/MM3 (ref 0–0.4)
EOSINOPHIL NFR BLD AUTO: 0.3 % (ref 0.3–6.2)
ERYTHROCYTE [DISTWIDTH] IN BLOOD BY AUTOMATED COUNT: 13.3 % (ref 12.3–15.4)
GFR SERPL CREATININE-BSD FRML MDRD: 51 ML/MIN/1.73
GLOBULIN UR ELPH-MCNC: 2.8 GM/DL
GLUCOSE SERPL-MCNC: 102 MG/DL (ref 65–99)
HCG SERPL QL: NEGATIVE
HCT VFR BLD AUTO: 43.8 % (ref 34–46.6)
HGB BLD-MCNC: 14.2 G/DL (ref 12–15.9)
HOLD SPECIMEN: NORMAL
IMM GRANULOCYTES # BLD AUTO: 0.13 10*3/MM3 (ref 0–0.05)
IMM GRANULOCYTES NFR BLD AUTO: 0.9 % (ref 0–0.5)
LYMPHOCYTES # BLD AUTO: 2.67 10*3/MM3 (ref 0.7–3.1)
LYMPHOCYTES NFR BLD AUTO: 18.3 % (ref 19.6–45.3)
MCH RBC QN AUTO: 28 PG (ref 26.6–33)
MCHC RBC AUTO-ENTMCNC: 32.4 G/DL (ref 31.5–35.7)
MCV RBC AUTO: 86.2 FL (ref 79–97)
MONOCYTES # BLD AUTO: 0.78 10*3/MM3 (ref 0.1–0.9)
MONOCYTES NFR BLD AUTO: 5.3 % (ref 5–12)
NEUTROPHILS NFR BLD AUTO: 10.86 10*3/MM3 (ref 1.7–7)
NEUTROPHILS NFR BLD AUTO: 74.5 % (ref 42.7–76)
NRBC BLD AUTO-RTO: 0 /100 WBC (ref 0–0.2)
NT-PROBNP SERPL-MCNC: 19.9 PG/ML (ref 0–450)
PLATELET # BLD AUTO: 218 10*3/MM3 (ref 140–450)
PMV BLD AUTO: 11.6 FL (ref 6–12)
POTASSIUM SERPL-SCNC: 4 MMOL/L (ref 3.5–5.2)
PROT SERPL-MCNC: 7.4 G/DL (ref 6–8.5)
RBC # BLD AUTO: 5.08 10*6/MM3 (ref 3.77–5.28)
SARS-COV-2 N GENE RESP QL NAA+PROBE: NOT DETECTED
SODIUM SERPL-SCNC: 135 MMOL/L (ref 136–145)
TROPONIN T SERPL-MCNC: <0.01 NG/ML (ref 0–0.03)
WBC # BLD AUTO: 14.59 10*3/MM3 (ref 3.4–10.8)
WHOLE BLOOD HOLD SPECIMEN: NORMAL
WHOLE BLOOD HOLD SPECIMEN: NORMAL

## 2020-12-06 PROCEDURE — 96374 THER/PROPH/DIAG INJ IV PUSH: CPT

## 2020-12-06 PROCEDURE — 71045 X-RAY EXAM CHEST 1 VIEW: CPT

## 2020-12-06 PROCEDURE — 93010 ELECTROCARDIOGRAM REPORT: CPT | Performed by: INTERNAL MEDICINE

## 2020-12-06 PROCEDURE — 93005 ELECTROCARDIOGRAM TRACING: CPT

## 2020-12-06 PROCEDURE — 80053 COMPREHEN METABOLIC PANEL: CPT | Performed by: STUDENT IN AN ORGANIZED HEALTH CARE EDUCATION/TRAINING PROGRAM

## 2020-12-06 PROCEDURE — 84484 ASSAY OF TROPONIN QUANT: CPT | Performed by: STUDENT IN AN ORGANIZED HEALTH CARE EDUCATION/TRAINING PROGRAM

## 2020-12-06 PROCEDURE — 85025 COMPLETE CBC W/AUTO DIFF WBC: CPT | Performed by: STUDENT IN AN ORGANIZED HEALTH CARE EDUCATION/TRAINING PROGRAM

## 2020-12-06 PROCEDURE — 83880 ASSAY OF NATRIURETIC PEPTIDE: CPT | Performed by: STUDENT IN AN ORGANIZED HEALTH CARE EDUCATION/TRAINING PROGRAM

## 2020-12-06 PROCEDURE — 99284 EMERGENCY DEPT VISIT MOD MDM: CPT

## 2020-12-06 PROCEDURE — 84703 CHORIONIC GONADOTROPIN ASSAY: CPT | Performed by: STUDENT IN AN ORGANIZED HEALTH CARE EDUCATION/TRAINING PROGRAM

## 2020-12-06 PROCEDURE — 93005 ELECTROCARDIOGRAM TRACING: CPT | Performed by: STUDENT IN AN ORGANIZED HEALTH CARE EDUCATION/TRAINING PROGRAM

## 2020-12-06 PROCEDURE — 25010000002 MORPHINE PER 10 MG: Performed by: STUDENT IN AN ORGANIZED HEALTH CARE EDUCATION/TRAINING PROGRAM

## 2020-12-06 PROCEDURE — 87635 SARS-COV-2 COVID-19 AMP PRB: CPT | Performed by: STUDENT IN AN ORGANIZED HEALTH CARE EDUCATION/TRAINING PROGRAM

## 2020-12-06 RX ORDER — SODIUM CHLORIDE 0.9 % (FLUSH) 0.9 %
10 SYRINGE (ML) INJECTION AS NEEDED
Status: DISCONTINUED | OUTPATIENT
Start: 2020-12-06 | End: 2020-12-06 | Stop reason: HOSPADM

## 2020-12-06 RX ORDER — ASPIRIN 325 MG
325 TABLET ORAL ONCE
Status: COMPLETED | OUTPATIENT
Start: 2020-12-06 | End: 2020-12-06

## 2020-12-06 RX ADMIN — MORPHINE SULFATE 4 MG: 4 INJECTION, SOLUTION INTRAMUSCULAR; INTRAVENOUS at 10:40

## 2020-12-06 RX ADMIN — ASPIRIN 325 MG: 325 TABLET, COATED ORAL at 09:07

## 2020-12-06 NOTE — ED NOTES
Alexandra faye attempted to draw troponin in left AC with no success.      Chula Lucas  12/06/20 1144       Chula Lucas  12/06/20 1140

## 2020-12-07 ENCOUNTER — OFFICE VISIT (OUTPATIENT)
Dept: ORTHOPEDIC SURGERY | Facility: CLINIC | Age: 45
End: 2020-12-07

## 2020-12-07 DIAGNOSIS — M43.16 SPONDYLOLISTHESIS OF LUMBAR REGION: Primary | ICD-10-CM

## 2020-12-07 DIAGNOSIS — M48.061 LUMBAR FORAMINAL STENOSIS: ICD-10-CM

## 2020-12-07 LAB
QT INTERVAL: 328 MS
QTC INTERVAL: 441 MS

## 2020-12-08 PROBLEM — M43.16 SPONDYLOLISTHESIS OF LUMBAR REGION: Status: ACTIVE | Noted: 2020-12-08

## 2020-12-08 PROBLEM — M48.061 LUMBAR FORAMINAL STENOSIS: Status: ACTIVE | Noted: 2020-12-08

## 2020-12-08 NOTE — PROGRESS NOTES
Subjective   Cherri Marsh is a 45 y.o. female.     This is a follow-up visit for a CT of her back as well as her hip.  She is 45 years old still having pain this is a telephonic visit because of the Covid situation with appropriate consent has been obtained.  We already discussed the results of her CT scans as above.  She is still having pain primarily in her right leg is been going on for over 2 years now she has been doing physical therapy at home with home health as well as been on several different steroids medications and activity modification.       The following portions of the patient's history were reviewed and updated as appropriate: allergies, current medications, past family history, past medical history, past social history, past surgical history and problem list.    Review of Systems   Constitutional: Positive for activity change.   Musculoskeletal: Positive for arthralgias, back pain and gait problem.   All other systems reviewed and are negative.      Objective   Physical Exam  Constitutional:       General: She is not in acute distress.     Appearance: She is well-developed.   Pulmonary:      Effort: Pulmonary effort is normal.   Neurological:      Mental Status: She is alert and oriented to person, place, and time.           Assessment/Plan   Diagnoses and all orders for this visit:    1. Spondylolisthesis of lumbar region (Primary)  -     Ambulatory Referral to Neurosurgery    2. Lumbar foraminal stenosis  -     Ambulatory Referral to Neurosurgery    Plan at this point her hip CT is really negative.  She does have foraminal stenosis with a spondylolisthesis at L5-S1.  Disc bulge produces fairly severe impingement of the right L5 nerve root which I believe is leading to her problem.  There is mild stenosis on the left side.  I think at this point the best course of action to get her referred to one of the neuro guys.  I want to get her CD copy so she can take this with her.  Her hips are  normal and that is not her problem.  May consider epidurals but other things that should be discussed with neurosurgery I explained this to her answered her questions today we will go ahead make that referral.  I told her to call me if she does not hear from somebody within a reasonable period of time.  We spent 15 minutes discussing this to go over the findings and where to go from here.  We will go ahead make a referral to neurosurgery

## 2020-12-10 ENCOUNTER — HOSPITAL ENCOUNTER (OUTPATIENT)
Dept: GENERAL RADIOLOGY | Facility: HOSPITAL | Age: 45
Discharge: HOME | End: 2020-12-10
Admitting: SURGERY

## 2020-12-10 DIAGNOSIS — R13.10 DYSPHAGIA, UNSPECIFIED TYPE: ICD-10-CM

## 2020-12-10 PROCEDURE — 74246 X-RAY XM UPR GI TRC 2CNTRST: CPT

## 2020-12-10 PROCEDURE — 63710000001 BARIUM SULFATE 96 % RECONSTITUTED SUSPENSION: Performed by: SURGERY

## 2020-12-10 PROCEDURE — A9270 NON-COVERED ITEM OR SERVICE: HCPCS | Performed by: SURGERY

## 2020-12-10 RX ADMIN — BARIUM SULFATE 100 ML: 960 POWDER, FOR SUSPENSION ORAL at 09:16

## 2020-12-15 ENCOUNTER — OFFICE VISIT (OUTPATIENT)
Dept: SURGERY | Facility: CLINIC | Age: 45
End: 2020-12-15

## 2020-12-15 DIAGNOSIS — Z09 FOLLOW UP: Primary | ICD-10-CM

## 2020-12-15 NOTE — PROGRESS NOTES
You have chosen to receive care through a telephone visit. Do you consent to use a telephone visit for your medical care today? Yes    CHIEF COMPLAINT:    Chief Complaint   Patient presents with   • Follow-up     UGI results.       HISTORY OF PRESENT ILLNESS:    Cherri Marsh is a 45 y.o. female who underwent upper GI to assess for sources of dysphagia in this patient who has a history of a fairly small benign left thyroid nodule.    She has continued intermittent dysphagia.    EXAM:  There were no vitals filed for this visit.      No exam, telephone visit    ASSESSMENT:    Normal UGI, occasional dysphagia    PLAN:    Follow up as needed for dysphagia. US for thyroid.          This document has been electronically signed by Stone Busby MD on December 15, 2020 11:21 CST

## 2021-03-19 ENCOUNTER — OFFICE VISIT (OUTPATIENT)
Dept: SURGERY | Facility: CLINIC | Age: 46
End: 2021-03-19

## 2021-03-19 VITALS
WEIGHT: 242 LBS | HEIGHT: 62 IN | DIASTOLIC BLOOD PRESSURE: 92 MMHG | BODY MASS INDEX: 44.53 KG/M2 | TEMPERATURE: 98.6 F | HEART RATE: 80 BPM | SYSTOLIC BLOOD PRESSURE: 134 MMHG

## 2021-03-19 DIAGNOSIS — E04.1 THYROID NODULE: Primary | ICD-10-CM

## 2021-03-19 PROCEDURE — 99213 OFFICE O/P EST LOW 20 MIN: CPT | Performed by: SURGERY

## 2021-03-19 NOTE — PROGRESS NOTES
CHIEF COMPLAINT:    Chief Complaint   Patient presents with   • Follow-up     Patrick thyroid nodule.       HISTORY OF PRESENT ILLNESS:    Cherri Marsh is a 45 y.o. female who has been seen previously for a thyroid nodule as well as dysphagia.  1 prior imaging her thyroid nodule was approximate 1.9 cm and appeared to be benign.  I do not believe that it was causing her dysphagia.  The patiently apparently underwent a recent carotid duplex due to numbness of the left side of her face and neurologic evaluation for possible TIA/stroke symptoms.  This showed normal flow in her carotids as well as redemonstrating the left thyroid cystic nodule which measured 2.1 cm on that study.    EXAM:  Vitals:    03/19/21 0927   BP: 134/92   Pulse: 80   Temp: 98.6 °F (37 °C)         No palpable thyroid nodule on exam    ASSESSMENT:    Known left thyroid nodule    PLAN:    Due to the patient's concerns about differences in size of the nodule as well as apparent continued intermittent dysphagia we will get a repeat formal thyroid ultrasound.  She return after the study is done.          This document has been electronically signed by Stone Busby MD on March 19, 2021 10:40 CDT

## 2021-03-22 ENCOUNTER — TELEPHONE (OUTPATIENT)
Dept: GENERAL RADIOLOGY | Facility: HOSPITAL | Age: 46
End: 2021-03-22

## 2021-03-22 ENCOUNTER — APPOINTMENT (OUTPATIENT)
Dept: ULTRASOUND IMAGING | Facility: HOSPITAL | Age: 46
End: 2021-03-22

## 2021-03-26 ENCOUNTER — HOSPITAL ENCOUNTER (OUTPATIENT)
Dept: ULTRASOUND IMAGING | Facility: HOSPITAL | Age: 46
Discharge: HOME | End: 2021-03-26
Admitting: SURGERY

## 2021-03-26 DIAGNOSIS — E04.1 THYROID NODULE: ICD-10-CM

## 2021-03-26 PROCEDURE — 76536 US EXAM OF HEAD AND NECK: CPT

## 2021-03-30 ENCOUNTER — OFFICE VISIT (OUTPATIENT)
Dept: SURGERY | Facility: CLINIC | Age: 46
End: 2021-03-30

## 2021-03-30 DIAGNOSIS — Z09 FOLLOW UP: Primary | ICD-10-CM

## 2021-03-30 PROCEDURE — 99024 POSTOP FOLLOW-UP VISIT: CPT | Performed by: SURGERY

## 2021-03-30 NOTE — PROGRESS NOTES
You have chosen to receive care through a telephone visit. Do you consent to use a telephone visit for your medical care today? Yes    Patient elected for telephone visit today due to a migraine headache    CHIEF COMPLAINT:    Chief Complaint   Patient presents with   • Follow-up     Thyroid ultrasound results.       HISTORY OF PRESENT ILLNESS:    Cherri Marsh is a 45 y.o. female who underwent repeat thyroid ultrasound.  Current ultrasound showed enlargement of the previously noted solid and cystic lesion in the left thyroid    EXAM:  There were no vitals filed for this visit.      deferred    ASSESSMENT:    Left thyroid nodule    PLAN:    Will see in office to discuss FNA of left thyroid nodule. Spent 2 minutes on phone.          This document has been electronically signed by Stone Busby MD on March 30, 2021 12:15 CDT

## 2021-04-01 ENCOUNTER — OFFICE VISIT (OUTPATIENT)
Dept: SURGERY | Facility: CLINIC | Age: 46
End: 2021-04-01

## 2021-04-01 VITALS
HEART RATE: 79 BPM | HEIGHT: 62 IN | WEIGHT: 245 LBS | TEMPERATURE: 98.4 F | DIASTOLIC BLOOD PRESSURE: 80 MMHG | BODY MASS INDEX: 45.08 KG/M2 | SYSTOLIC BLOOD PRESSURE: 118 MMHG

## 2021-04-01 DIAGNOSIS — E04.1 THYROID NODULE: Primary | ICD-10-CM

## 2021-04-01 PROCEDURE — 99213 OFFICE O/P EST LOW 20 MIN: CPT | Performed by: SURGERY

## 2021-04-01 NOTE — PROGRESS NOTES
CHIEF COMPLAINT:    Reevaluate left thyroid nodule    HISTORY OF PRESENT ILLNESS:    Cherri Marsh is a 45 y.o. female who has been seen previously for left thyroid nodule.  She recently underwent repeat thyroid ultrasound that showed the nodule had enlarged somewhat and was now 2.5cm mixed solid and cystic lesion.  Biopsy was recommended.  She notes no new symptoms, other than continued weight gain.    History reviewed. No pertinent past medical history.    History reviewed. No pertinent surgical history.    Prior to Admission medications    Medication Sig Start Date End Date Taking? Authorizing Provider   albuterol sulfate  (90 Base) MCG/ACT inhaler As Needed.   Yes ProviderBrandon MD   Cariprazine HCl (Vraylar) 3 MG capsule capsule Take 3 mg by mouth Daily.   Yes Brandon Bland MD   Cariprazine HCl (Vraylar) 3 MG capsule capsule Daily.   Yes Brandon Bland MD   clonazePAM (KlonoPIN) 1 MG tablet Take 1 mg by mouth.   Yes ProviderBrandon MD   lamoTRIgine ER 25 MG tablet sustained-release 24 hour Daily. 10/30/20  Yes Brandon Bland MD   lisinopril (PRINIVIL,ZESTRIL) 20 MG tablet Take 20 mg by mouth Daily.   Yes ProviderBrandon MD   meclizine (ANTIVERT) 25 MG tablet As Needed.   Yes ProviderBrandon MD   metoprolol tartrate (LOPRESSOR) 25 MG tablet Take 1 tablet by mouth Every 12 (Twelve) Hours.   Yes ProviderBrandon MD   omeprazole (priLOSEC) 20 MG capsule Take 40 mg by mouth As Needed.   Yes ProviderBrandon MD   pantoprazole (PROTONIX) 40 MG EC tablet Take 40 mg by mouth.   Yes ProviderBrandon MD   prazosin (MINIPRESS) 1 MG capsule Take 1 mg by mouth Every Night.   Yes ProviderBrandon MD   Spacer/Aero-Holding Chambers (OptiChamber Agueda) misc As Needed.   Yes Brandon Bland MD   SUMAtriptan (Imitrex) 50 MG tablet As Needed.   Yes Brandon Bland MD   tiZANidine (ZANAFLEX) 4 MG tablet Take 4 mg by mouth.   Yes Baldo  "MD Brandon   topiramate (TOPAMAX) 100 MG tablet Take 50 mg by mouth.   Yes Brandon Bland MD   topiramate (TOPAMAX) 50 MG tablet Daily. 8/31/20  Yes Brandon Bland MD   traZODone (DESYREL) 100 MG tablet Take 100 mg by mouth.   Yes Brandon Bland MD   triamterene-hydrochlorothiazide (DYAZIDE) 37.5-25 MG per capsule Take 1 capsule by mouth Every Morning.   Yes Brandon Bland MD       Allergies   Allergen Reactions   • Prednisone Itching       History reviewed. No pertinent family history.    Social History     Socioeconomic History   • Marital status:      Spouse name: Not on file   • Number of children: Not on file   • Years of education: Not on file   • Highest education level: Not on file   Tobacco Use   • Smoking status: Never Smoker   • Smokeless tobacco: Never Used   Substance and Sexual Activity   • Alcohol use: Not Currently       Review of Systems   Constitutional: Positive for unexpected weight change (gain). Negative for appetite change.   Hematological: Does not bruise/bleed easily.       Objective     /80   Pulse 79   Temp 98.4 °F (36.9 °C) (Oral)   Ht 156.2 cm (61.5\")   Wt 111 kg (245 lb)   BMI 45.54 kg/m²     Physical Exam  Constitutional:       General: She is not in acute distress.     Appearance: Normal appearance. She is obese. She is not ill-appearing, toxic-appearing or diaphoretic.   HENT:      Head: Normocephalic and atraumatic.   Eyes:      General: No scleral icterus.        Right eye: No discharge.         Left eye: No discharge.      Extraocular Movements: Extraocular movements intact.      Conjunctiva/sclera: Conjunctivae normal.   Cardiovascular:      Rate and Rhythm: Normal rate.   Pulmonary:      Effort: Pulmonary effort is normal. No respiratory distress.   Skin:     General: Skin is warm.   Neurological:      General: No focal deficit present.      Mental Status: She is alert and oriented to person, place, and time.   Psychiatric:       "   Mood and Affect: Mood normal.         Behavior: Behavior normal.         Thought Content: Thought content normal.         Judgment: Judgment normal.         DIAGNOSTIC DATA:    Most recent ultrasound reviewed showing 2.5 cm mixed nodule in the left thyroid with a small nodule seen elsewhere.    ASSESSMENT:    Left thyroid nodule    PLAN:    Recommended she undergo ultrasound-guided fine-needle aspiration biopsy.  She is agreeable to this.  The risk and benefits of ultrasound-guided fine-needle aspiration biopsy of left thyroid nodule have been discussed and she is scheduled for 4/9/2021.          This document has been electronically signed by Stone Busby MD on April 1, 2021 15:33 CDT

## 2021-04-09 ENCOUNTER — HOSPITAL ENCOUNTER (OUTPATIENT)
Dept: ULTRASOUND IMAGING | Facility: HOSPITAL | Age: 46
Discharge: HOME | End: 2021-04-09
Admitting: SURGERY

## 2021-04-09 VITALS
TEMPERATURE: 99 F | HEART RATE: 72 BPM | RESPIRATION RATE: 16 BRPM | SYSTOLIC BLOOD PRESSURE: 110 MMHG | DIASTOLIC BLOOD PRESSURE: 68 MMHG | OXYGEN SATURATION: 100 %

## 2021-04-09 DIAGNOSIS — E04.1 THYROID NODULE: ICD-10-CM

## 2021-04-09 PROCEDURE — 10005 FNA BX W/US GDN 1ST LES: CPT | Performed by: SURGERY

## 2021-04-09 PROCEDURE — 88172 CYTP DX EVAL FNA 1ST EA SITE: CPT

## 2021-04-09 PROCEDURE — 88173 CYTOPATH EVAL FNA REPORT: CPT

## 2021-04-09 PROCEDURE — 88177 CYTP FNA EVAL EA ADDL: CPT

## 2021-04-09 PROCEDURE — 88305 TISSUE EXAM BY PATHOLOGIST: CPT

## 2021-04-09 NOTE — INTERVAL H&P NOTE
H&P reviewed. The patient was examined and there are no changes to the H&P.          This document has been electronically signed by Stone Busby MD on April 9, 2021 08:08 CDT

## 2021-04-13 LAB
LAB AP CASE REPORT: NORMAL
PATH REPORT.FINAL DX SPEC: NORMAL

## 2021-04-15 ENCOUNTER — OFFICE VISIT (OUTPATIENT)
Dept: SURGERY | Facility: CLINIC | Age: 46
End: 2021-04-15

## 2021-04-15 VITALS
BODY MASS INDEX: 45.25 KG/M2 | WEIGHT: 243.4 LBS | HEART RATE: 91 BPM | DIASTOLIC BLOOD PRESSURE: 82 MMHG | SYSTOLIC BLOOD PRESSURE: 130 MMHG | TEMPERATURE: 97.6 F

## 2021-04-15 DIAGNOSIS — Z09 FOLLOW UP: Primary | ICD-10-CM

## 2021-04-15 PROCEDURE — 99212 OFFICE O/P EST SF 10 MIN: CPT | Performed by: SURGERY

## 2021-04-15 NOTE — PROGRESS NOTES
CHIEF COMPLAINT:    Chief Complaint   Patient presents with   • Follow-up     Patrick- Thyroid FNA results       HISTORY OF PRESENT ILLNESS:    Cherri Marsh is a 45 y.o. female who underwent thyroid nodule fine-needle aspiration biopsy.  Pathology showed a benign cystic goitrous follicular nodule.  This benign result was discussed with the patient today.  She has no new complaints today.    EXAM:  Vitals:    04/15/21 0912   BP: 130/82   Pulse: 91   Temp: 97.6 °F (36.4 °C)         No bruising or marking on neck at biopsy site    ASSESSMENT:    Status post benign left thyroid fine-needle aspiration biopsy    PLAN:    She has repeat thyroid imaging at the end of the year.  She can see me as needed.          This document has been electronically signed by Stone Busby MD on April 15, 2021 09:17 CDT

## 2022-12-22 NOTE — ED PROVIDER NOTES
"Subjective   45-year-old female tested positive for Covid a few days ago comes to the ER with worsening shortness of breath and chest pain.  She has bilateral lower extremity nonpitting edema, which is chronic for her.  She reports the swelling is \"a little bit worse than normal\".  She does not have much energy.  Denies coughing or abdominal symptoms.  No fevers or chills.      History provided by:  Patient   used: No        Review of Systems   Constitutional: Positive for activity change and fatigue. Negative for appetite change, chills, diaphoresis and fever.   HENT: Negative for congestion and rhinorrhea.    Respiratory: Positive for shortness of breath. Negative for cough and wheezing.    Cardiovascular: Positive for chest pain. Negative for palpitations and leg swelling.   Gastrointestinal: Negative for abdominal pain, diarrhea and nausea.   Genitourinary: Negative for dysuria and flank pain.   Skin: Negative for color change and rash.   Neurological: Negative for dizziness and headaches.   Psychiatric/Behavioral: Negative for agitation. The patient is not nervous/anxious.        History reviewed. No pertinent past medical history.    Allergies   Allergen Reactions   • Prednisone Itching       History reviewed. No pertinent surgical history.    History reviewed. No pertinent family history.    Social History     Socioeconomic History   • Marital status:      Spouse name: Not on file   • Number of children: Not on file   • Years of education: Not on file   • Highest education level: Not on file   Tobacco Use   • Smoking status: Never Smoker   • Smokeless tobacco: Never Used   Substance and Sexual Activity   • Alcohol use: Not Currently           Objective    Vitals:    12/06/20 0901 12/06/20 0931 12/06/20 1001 12/06/20 1031   BP:  119/76 113/70 112/77   BP Location:       Patient Position:       Pulse:  86 82 82   Resp:   20 20   Temp:       TempSrc:       SpO2:  97% 95% 96%   Weight: " 106 kg (234 lb)      Height:           Physical Exam  Vitals signs and nursing note reviewed.   Constitutional:       General: She is not in acute distress.     Appearance: She is well-developed. She is obese. She is ill-appearing. She is not toxic-appearing or diaphoretic.   HENT:      Head: Normocephalic.      Right Ear: External ear normal.      Left Ear: External ear normal.   Eyes:      Conjunctiva/sclera: Conjunctivae normal.   Cardiovascular:      Rate and Rhythm: Normal rate.      Pulses: Normal pulses.   Pulmonary:      Effort: Pulmonary effort is normal. No accessory muscle usage or respiratory distress.      Breath sounds: No decreased breath sounds, wheezing or rhonchi.   Chest:      Chest wall: Tenderness present.   Abdominal:      General: Bowel sounds are normal.      Palpations: Abdomen is soft. Abdomen is not rigid.      Tenderness: There is no abdominal tenderness (deep palpation).   Musculoskeletal: Normal range of motion.         General: Swelling (bilateral (chronic)) present. No tenderness.   Skin:     General: Skin is warm and dry.      Capillary Refill: Capillary refill takes less than 2 seconds.   Neurological:      Mental Status: She is alert and oriented to person, place, and time. Mental status is at baseline. She is not disoriented.   Psychiatric:         Behavior: Behavior normal.         ECG 12 Lead      Date/Time: 12/6/2020 11:33 AM  Performed by: Nato Truong MD  Authorized by: Nato Truong MD   Interpreted by physician  Rhythm: sinus tachycardia  Rate: tachycardic  QRS axis: normal  ST Segments: ST segments normal  T Waves: T waves normal          ED Course      Results for orders placed or performed during the hospital encounter of 12/06/20   Troponin    Specimen: Blood   Result Value Ref Range    Troponin T <0.010 0.000 - 0.030 ng/mL   Comprehensive Metabolic Panel    Specimen: Blood   Result Value Ref Range    Glucose 102 (H) 65 - 99 mg/dL    BUN 18 6 - 20 mg/dL     Creatinine 1.16 (H) 0.57 - 1.00 mg/dL    Sodium 135 (L) 136 - 145 mmol/L    Potassium 4.0 3.5 - 5.2 mmol/L    Chloride 101 98 - 107 mmol/L    CO2 21.0 (L) 22.0 - 29.0 mmol/L    Calcium 10.2 8.6 - 10.5 mg/dL    Total Protein 7.4 6.0 - 8.5 g/dL    Albumin 4.60 3.50 - 5.20 g/dL    ALT (SGPT) 13 1 - 33 U/L    AST (SGOT) 12 1 - 32 U/L    Alkaline Phosphatase 84 39 - 117 U/L    Total Bilirubin 0.3 0.0 - 1.2 mg/dL    eGFR Non African Amer 51 (L) >60 mL/min/1.73    Globulin 2.8 gm/dL    A/G Ratio 1.6 g/dL    BUN/Creatinine Ratio 15.5 7.0 - 25.0    Anion Gap 13.0 5.0 - 15.0 mmol/L   BNP    Specimen: Blood   Result Value Ref Range    proBNP 19.9 0.0 - 450.0 pg/mL   hCG, Serum, Qualitative    Specimen: Blood   Result Value Ref Range    HCG Qualitative Negative Negative   CBC Auto Differential    Specimen: Blood   Result Value Ref Range    WBC 14.59 (H) 3.40 - 10.80 10*3/mm3    RBC 5.08 3.77 - 5.28 10*6/mm3    Hemoglobin 14.2 12.0 - 15.9 g/dL    Hematocrit 43.8 34.0 - 46.6 %    MCV 86.2 79.0 - 97.0 fL    MCH 28.0 26.6 - 33.0 pg    MCHC 32.4 31.5 - 35.7 g/dL    RDW 13.3 12.3 - 15.4 %    RDW-SD 42.2 37.0 - 54.0 fl    MPV 11.6 6.0 - 12.0 fL    Platelets 218 140 - 450 10*3/mm3    Neutrophil % 74.5 42.7 - 76.0 %    Lymphocyte % 18.3 (L) 19.6 - 45.3 %    Monocyte % 5.3 5.0 - 12.0 %    Eosinophil % 0.3 0.3 - 6.2 %    Basophil % 0.7 0.0 - 1.5 %    Immature Grans % 0.9 (H) 0.0 - 0.5 %    Neutrophils, Absolute 10.86 (H) 1.70 - 7.00 10*3/mm3    Lymphocytes, Absolute 2.67 0.70 - 3.10 10*3/mm3    Monocytes, Absolute 0.78 0.10 - 0.90 10*3/mm3    Eosinophils, Absolute 0.05 0.00 - 0.40 10*3/mm3    Basophils, Absolute 0.10 0.00 - 0.20 10*3/mm3    Immature Grans, Absolute 0.13 (H) 0.00 - 0.05 10*3/mm3    nRBC 0.0 0.0 - 0.2 /100 WBC   Light Blue Top   Result Value Ref Range    Extra Tube hold for add-on    Green Top (Gel)   Result Value Ref Range    Extra Tube Hold for add-ons.    Lavender Top   Result Value Ref Range    Extra Tube hold for  add-on      XR Chest 1 View   Final Result   No evidence of active disease.      Electronically signed by:  Uriel Hartmann MD  12/6/2020 9:56 AM CST   Workstation: [id number]            HEART Score (for prediction of 6-week risk of major adverse cardiac event) reviewed and/or performed as part of the patient evaluation and treatment planning process.  The result associated with this review/performance is: 1    Wells' Criteria (for pulmonary embolism) reviewed and/or performed as part of the patient evaluation and treatment planning process.  The result associated with this review/performance is: 0       MDM  Number of Diagnoses or Management Options  Chest pain, unspecified type:   SOB (shortness of breath):   Diagnosis management comments: Vital signs are stable, afebrile.  Labs are unremarkable.  Chest x-ray shows no acute cardiopulmonary processes. Heart score of 1.  Wells PE score of 0.  Tachycardia has resolved.  On reevaluation, patient is alert and resting comfortably. I discussed the results of the emergency department evaluation.  I recommended primary care follow-up.  Return precautions discussed.       Amount and/or Complexity of Data Reviewed  Clinical lab tests: ordered and reviewed  Tests in the radiology section of CPT®: ordered and reviewed  Tests in the medicine section of CPT®: reviewed and ordered  Decide to obtain previous medical records or to obtain history from someone other than the patient: yes  Review and summarize past medical records: yes    Patient Progress  Patient progress: improved      Final diagnoses:   Chest pain, unspecified type   SOB (shortness of breath)            Nato Truong MD  12/06/20 [id number]     ,DirectAddress_Unknown